# Patient Record
Sex: FEMALE | Race: WHITE | URBAN - METROPOLITAN AREA
[De-identification: names, ages, dates, MRNs, and addresses within clinical notes are randomized per-mention and may not be internally consistent; named-entity substitution may affect disease eponyms.]

---

## 2018-04-04 ENCOUNTER — AMBULATORY - HEALTHEAST (OUTPATIENT)
Dept: LAB | Facility: CLINIC | Age: 38
End: 2018-04-04

## 2018-04-04 DIAGNOSIS — O09.211 SUPERVISION OF PREGNANCY WITH HISTORY OF PRE-TERM LABOR IN FIRST TRIMESTER: ICD-10-CM

## 2018-04-04 DIAGNOSIS — Z34.90 PREGNANCY: ICD-10-CM

## 2018-04-04 LAB
HCG SERPL-ACNC: 888 MLU/ML (ref 0–4)
PROGEST SERPL-MCNC: 18.6 NG/ML

## 2018-05-03 ENCOUNTER — AMBULATORY - HEALTHEAST (OUTPATIENT)
Dept: LAB | Facility: CLINIC | Age: 38
End: 2018-05-03

## 2018-05-03 DIAGNOSIS — O09.891: ICD-10-CM

## 2018-05-03 LAB — PROGEST SERPL-MCNC: 22.2 NG/ML

## 2018-05-16 LAB
ABORH_EXT (HISTORICAL CONVERSION): NORMAL
ANTIBODY_EXT (HISTORICAL CONVERSION): NEGATIVE
HBSAG_EXT (HISTORICAL CONVERSION): NORMAL
HGB_EXT (HISTORICAL CONVERSION): 13.6
PLT_EXT - HISTORICAL: 279
RPR - HISTORICAL: NORMAL
RUBELLA_EXT (HISTORICAL CONVERSION): NORMAL

## 2018-05-31 ENCOUNTER — AMBULATORY - HEALTHEAST (OUTPATIENT)
Dept: LAB | Facility: CLINIC | Age: 38
End: 2018-05-31

## 2018-05-31 DIAGNOSIS — O09.211 SUPERVISION OF PREGNANCY WITH HISTORY OF PRE-TERM LABOR IN FIRST TRIMESTER: ICD-10-CM

## 2018-05-31 LAB — PROGEST SERPL-MCNC: 34.9 NG/ML

## 2018-11-09 ENCOUNTER — HOSPITAL ENCOUNTER (OUTPATIENT)
Dept: OBGYN | Facility: HOSPITAL | Age: 38
Discharge: HOME | End: 2018-11-10
Attending: OBSTETRICS & GYNECOLOGY | Admitting: OBSTETRICS & GYNECOLOGY

## 2018-11-09 LAB — GBS_EXT (HISTORICAL CONVERSION): POSITIVE

## 2018-11-24 ENCOUNTER — HOSPITAL ENCOUNTER (OUTPATIENT)
Dept: OBGYN | Facility: HOSPITAL | Age: 38
Discharge: HOME | End: 2018-11-24
Attending: OBSTETRICS & GYNECOLOGY | Admitting: OBSTETRICS & GYNECOLOGY

## 2018-11-24 LAB
ALBUMIN UR-MCNC: NEGATIVE MG/DL
APPEARANCE UR: CLEAR
BILIRUB UR QL STRIP: NEGATIVE
CLUE CELLS: ABNORMAL
COLOR UR AUTO: YELLOW
GLUCOSE UR STRIP-MCNC: NEGATIVE MG/DL
HGB UR QL STRIP: NEGATIVE
KETONES UR STRIP-MCNC: NEGATIVE MG/DL
LEUKOCYTE ESTERASE UR QL STRIP: NEGATIVE
NITRATE UR QL: NEGATIVE
PH UR STRIP: 6.5 [PH] (ref 4.5–8)
RUPTURE OF FETAL MEMBRANES BY ROM PLUS: NEGATIVE
SP GR UR STRIP: 1 (ref 1–1.03)
TRICHOMONAS, WET PREP: ABNORMAL
UROBILINOGEN UR STRIP-ACNC: NORMAL
YEAST, WET PREP: ABNORMAL

## 2018-11-24 ASSESSMENT — MIFFLIN-ST. JEOR: SCORE: 1598.48

## 2018-11-29 ENCOUNTER — RECORDS - HEALTHEAST (OUTPATIENT)
Dept: ADMINISTRATIVE | Facility: OTHER | Age: 38
End: 2018-11-29

## 2018-12-09 ENCOUNTER — HOME CARE/HOSPICE - HEALTHEAST (OUTPATIENT)
Dept: HOME HEALTH SERVICES | Facility: HOME HEALTH | Age: 38
End: 2018-12-09

## 2018-12-10 ENCOUNTER — HOME CARE/HOSPICE - HEALTHEAST (OUTPATIENT)
Dept: HOME HEALTH SERVICES | Facility: HOME HEALTH | Age: 38
End: 2018-12-10

## 2019-01-10 ENCOUNTER — COMMUNICATION - HEALTHEAST (OUTPATIENT)
Dept: MIDWIFE SERVICES | Facility: CLINIC | Age: 39
End: 2019-01-10

## 2019-01-17 ENCOUNTER — COMMUNICATION - HEALTHEAST (OUTPATIENT)
Dept: MIDWIFE SERVICES | Facility: CLINIC | Age: 39
End: 2019-01-17

## 2019-04-10 NOTE — TELEPHONE ENCOUNTER
FUTURE VISIT INFORMATION      FUTURE VISIT INFORMATION:    Date: 6/13/19    Time: 10:00am    Location: [facility] [place]  REFERRAL INFORMATION:    Referring provider:  Dr. Marcelino Flores    Referring providers clinic:  [facility]    Reason for visit/diagnosis:  Constipation    NOTES STATUS DETAILS   OFFICE NOTE from referring provider  In process    OFFICE NOTE from other specialist   N/A    DISCHARGE SUMMARY FROM HOSPITAL N/A    DISCHARGE REPORT FROM ED N/A    OPERATIVE REPORT  N/A    PFC REPORT N/A    MEDICATION LIST N/A    LABS     FIT/STOOL TESTING Care Everywhere    PERTINENT LABS N/A    PATHOLOGY REPORTS RELATED TO DIAGNOSIS N/A    DIAGNOSTIC PROCEDURES     COLONOSCOPY N/A    ENDOSCOPY (EGD) N/A    ERCP N/A    EUS N/A    FLEX SIGMOIDOSCOPY N/A    IMAGING & REPORT      CT, MRI, US, XR N/A      Mike ABUROT 6.7.19   Action Taken LVM at [facility] for medical records.     Mike ABURTO 6.10.19 2:32 PM   Action Taken LVM at [facility] for medical records. Phone number is [phone]     Mike ABURTO 6.11.19    Action Taken Received vm from [facility] stating pt was seen for OBGYN related issues, nothing related to GI.

## 2019-06-06 ENCOUNTER — TELEPHONE (OUTPATIENT)
Dept: GASTROENTEROLOGY | Facility: CLINIC | Age: 39
End: 2019-06-06

## 2019-06-12 ASSESSMENT — ENCOUNTER SYMPTOMS
HOARSE VOICE: 0
JOINT SWELLING: 0
JAUNDICE: 0
CHILLS: 0
NERVOUS/ANXIOUS: 1
CONSTIPATION: 1
RECTAL PAIN: 1
HEADACHES: 1
EYE PAIN: 0
INCREASED ENERGY: 1
SLEEP DISTURBANCES DUE TO BREATHING: 0
DECREASED APPETITE: 0
SORE THROAT: 0
SINUS PAIN: 0
FATIGUE: 1
POLYPHAGIA: 1
BLOATING: 1
LIGHT-HEADEDNESS: 1
SEIZURES: 0
SPEECH CHANGE: 0
ABDOMINAL PAIN: 1
PALPITATIONS: 0
POOR WOUND HEALING: 1
BACK PAIN: 1
VOMITING: 0
EYE WATERING: 1
MYALGIAS: 1
DIARRHEA: 1
NECK PAIN: 0
DIFFICULTY URINATING: 0
DECREASED LIBIDO: 1
NECK MASS: 0
SMELL DISTURBANCE: 0
INSOMNIA: 0
WEIGHT LOSS: 0
ORTHOPNEA: 0
TASTE DISTURBANCE: 1
ARTHRALGIAS: 1
BLOOD IN STOOL: 0
NAUSEA: 1
DISTURBANCES IN COORDINATION: 0
FEVER: 0
SYNCOPE: 0
LOSS OF CONSCIOUSNESS: 0
NIGHT SWEATS: 0
STIFFNESS: 0
BREAST MASS: 0
MUSCLE CRAMPS: 0
TREMORS: 0
FLANK PAIN: 0
EYE IRRITATION: 1
POLYDIPSIA: 1
ALTERED TEMPERATURE REGULATION: 1
DECREASED CONCENTRATION: 0
EYE REDNESS: 1
NAIL CHANGES: 0
HEARTBURN: 1
PARALYSIS: 0
HYPERTENSION: 0
TROUBLE SWALLOWING: 0
HEMATURIA: 0
NUMBNESS: 0
MEMORY LOSS: 0
BREAST PAIN: 1
MUSCLE WEAKNESS: 0
BOWEL INCONTINENCE: 1
SKIN CHANGES: 1
SINUS CONGESTION: 1
HOT FLASHES: 1
DYSURIA: 0
LEG PAIN: 0
HYPOTENSION: 0
PANIC: 0
EXERCISE INTOLERANCE: 1
WEIGHT GAIN: 1
DOUBLE VISION: 0
DIZZINESS: 1
TINGLING: 0
HALLUCINATIONS: 0
DEPRESSION: 0

## 2019-06-13 ENCOUNTER — OFFICE VISIT (OUTPATIENT)
Dept: GASTROENTEROLOGY | Facility: CLINIC | Age: 39
End: 2019-06-13
Payer: COMMERCIAL

## 2019-06-13 ENCOUNTER — PRE VISIT (OUTPATIENT)
Dept: GASTROENTEROLOGY | Facility: CLINIC | Age: 39
End: 2019-06-13

## 2019-06-13 VITALS
WEIGHT: 183.2 LBS | HEART RATE: 71 BPM | OXYGEN SATURATION: 98 % | BODY MASS INDEX: 30.52 KG/M2 | DIASTOLIC BLOOD PRESSURE: 69 MMHG | SYSTOLIC BLOOD PRESSURE: 115 MMHG | HEIGHT: 65 IN

## 2019-06-13 DIAGNOSIS — R19.4 CHANGE IN BOWEL HABITS: Primary | ICD-10-CM

## 2019-06-13 DIAGNOSIS — R19.4 CHANGE IN BOWEL HABITS: ICD-10-CM

## 2019-06-13 RX ORDER — MULTIVITAMIN WITH IRON
1 TABLET ORAL DAILY
COMMUNITY

## 2019-06-13 RX ORDER — PRENATAL VIT/IRON FUM/FOLIC AC 27MG-0.8MG
1 TABLET ORAL
COMMUNITY
Start: 2014-09-28 | End: 2020-05-04

## 2019-06-13 ASSESSMENT — MIFFLIN-ST. JEOR: SCORE: 1503.93

## 2019-06-13 ASSESSMENT — PAIN SCALES - GENERAL: PAINLEVEL: NO PAIN (0)

## 2019-06-13 NOTE — LETTER
6/13/2019       RE: Chiquis King  [address]  [address] [place] [address]     Dear Colleague,    Thank you for referring your patient, Chiquis King, to the [facility] GASTROENTEROLOGY AND IBD CLINIC at [facility]. Please see a copy of my visit note below.    GI CLINIC VISIT    CC/REFERRING MD:  Referred Self  REASON FOR CONSULTATION: Change in bowel pattern    ASSESSMENT/PLAN:  39-year-old female with past medical history to include herbal tunnel, tendinitis, 3 vaginal deliveries who presents to the GI clinic for consultation regarding change in bowel habits.    1.  Change in bowel habits: Patient has a concern for ongoing constipation.  Patient certainly would not meet the criteria for constipation given the consistency and frequency, usually a bowel movement per day.  She feels she is been quite restricted on her diet to allow for a more acceptable and reliable pattern.  She does take magnesium oxide 250 mg daily.  I have suggested for the patient to switch to magnesium oxide and start at 400 mg daily, max dose of the thousand milligrams daily.  This and comities with a soluble fiber supplementation, patient prefers psyllium husk may be tried to improve regularity.  To ensure that no inflammation is contributing to the patient's symptoms we will also obtain a fecal calprotectin.  Patient is unsure if she is ever been tested for celiac disease and we will proceed with TTG IgA and IgG as well as a total IgA.  Her stool pattern does not seem at all consistent with an infection and will not proceed with stool studies at this time but could be considered in the future.  --Change magnesium formulation to magnesium oxide, start at 400 mg daily and titrate to effect.  Of 2000 mg daily.  --Trial of soluble fiber, psyllium husk.  Start with a tablespoon per day and increase/decrease based on stool frequency and consistency.  --Start food and symptom journal  --Dietitian  "referral  --Stool sample for fecal calprotectin  --Blood work for TTG IgA and IgG and total IgA to screen for celiac disease    2. Colorectal cancer screening: Patient has no personal family history of colorectal cancer.  Recommend screening at the age of 45-50 unless symptomatic sooner.    RTC PRN    Thank you for this consultation.  It was a pleasure to participate in the care of this patient; please contact us with any further questions.     Jitendra Pacheco PA-C  Division of Gastroenterology, Hepatology and Nutrition  [facility]      HPI  39-year-old female with past medical history to include herbal tunnel, tendinitis, 3 vaginal deliveries who presents to the GI clinic for consultation regarding change in bowel habits.    Patient has had concerns for ongoing problems with constipation during and shortly following her first pregnancy.  Since this time she has primarily used magnesium citrate in gel capsule form at a dose of 250 mg to help with management.  She is also trying to variations in diet, probiotics, raw vegetables and daily caffeine intake to prompt a bowel movement.    Over the last 6 months she has found that her regimen of align probiotic, ensuring adequate intake of raw vegetables and magnesium citrate 250 mg daily allows for 1 bowel movement in the morning if she is at home.  No blood in the stool.  She does not include any of these regimens, she feels that she is \"off.\"  She may have a more loose stool, and may skip a day without a bowel movement.  Usually bowel movements are Crook scale 5-6.  She is not having liquid water stools.  She has had hemorrhoids present since her first pregnancy.  These have never been treated.    Patient is most bothered by the restrictive diet to maintain regularity.  She also notes soreness in her rectum and lower abdominal discomfort after a bowel movement that will eventually subside.  She has tried Metamucil and psyllium husk however not in long " duration and was unsure of the benefit.    ROS:    No fevers or chills  No weight loss  No blurry vision, double vision or change in vision  No sore throat  No lymphadenopathy  No headache, paraesthesias, or weakness in a limb  No shortness of breath or wheezing  No chest pain or pressure  No arthralgias or myalgias  No rashes or skin changes  No odynophagia or dysphagia  No BRBPR, hematochezia, melena  No dysuria, frequency or urgency  No hot/cold intolerance or polyria  No anxiety or depression    PROBLEM LIST  There are no active problems to display for this patient.    PERTINENT PAST MEDICAL HISTORY:  Past Medical History:   Diagnosis Date     Anxiety 2006?     Arthritis 2016    low back/hip area     Bone and joint disord back, pelvis, leg complicat preg, childb, puerp 200?: thoracic scoliosis    2018: Diastasis Recti; 2013 tendonitis; 2018 carpeltunnel     Chronic constipation 2014?     Chronic diarrhea 2014?    consistently occasional     Depressive disorder 2012?     Endocrine problem since adolescence    clammy hands     Fecal incontinence 2018?    from hemorrhoids     Fracture 1991?    arm     Head injury 1994/5 8th grade    softball hit hard on forehead     History of diabetes mellitus 2012&2014    pregnancy     Migraines 2010?     Other heart disorders in diseases classified elsewhere 1986?    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    Since first pregnancy?     Urinary tract infection 2012 and 1 other since?       PREVIOUS SURGERIES:  No past surgical history on file.    ALLERGIES:   No Known Allergies    PERTINENT MEDICATIONS:    Current Outpatient Medications:      CHOLECALCIFEROL PO, Take by mouth daily, Disp: , Rfl:      MAGNESIUM CITRATE PO, 250 mg daily, Disp: , Rfl:      Prenatal Vit-Fe Fumarate-FA (PRENATAL MULTIVITAMIN W/IRON) 27-0.8 MG tablet, Take 1 tablet by mouth, Disp: , Rfl:      sertraline (ZOLOFT) 50 MG tablet, Take 50 mg by  mouth, Disp: , Rfl:      UNABLE TO FIND, MEDICATION NAME: [place] Probiotic, Once a day, Disp: , Rfl:      UNABLE TO FIND, MEDICATION NAME: Align Probiotic, once daily, Disp: , Rfl:      vitamin (B COMPLEX-C) tablet, Take 1 tablet by mouth daily, Disp: , Rfl:     SOCIAL HISTORY:  Social History     Socioeconomic History     Marital status:      Spouse name: Not on file     Number of children: Not on file     Years of education: Not on file     Highest education level: Not on file   Occupational History     Not on file   Social Needs     Financial resource strain: Not on file     Food insecurity:     Worry: Not on file     Inability: Not on file     Transportation needs:     Medical: Not on file     Non-medical: Not on file   Tobacco Use     Smoking status: Never Smoker     Smokeless tobacco: Never Used   Substance and Sexual Activity     Alcohol use: Yes     Comment: 1-5 drinks/week     Drug use: Never     Sexual activity: Yes     Partners: Male     Birth control/protection: Abstinence, Condom, Natural Family Planning   Lifestyle     Physical activity:     Days per week: Not on file     Minutes per session: Not on file     Stress: Not on file   Relationships     Social connections:     Talks on phone: Not on file     Gets together: Not on file     Attends Religion service: Not on file     Active member of club or organization: Not on file     Attends meetings of clubs or organizations: Not on file     Relationship status: Not on file     Intimate partner violence:     Fear of current or ex partner: Not on file     Emotionally abused: Not on file     Physically abused: Not on file     Forced sexual activity: Not on file   Other Topics Concern     Not on file   Social History Narrative     Not on file       FAMILY HISTORY:  FH of CRC: None  FH of IBD: None  Family History   Problem Relation Age of Onset     Diabetes Maternal Grandmother          from kidney failure as a result     Breast Cancer  "Cousin         recent diagnosis     Colon Cancer Cousin          in the  or      Other Cancer Paternal Grandfather         Non Hodgkins Lymphoma/Leukemia     Other Cancer Father         Non Hodgkins Lymphoma/Leukemia     Depression Niece      Anxiety Disorder Niece      Unknown/Adopted Maternal Half-Brother         My mom had a baby boy prior to  who was adopted at birth       Past/family/social history reviewed and no changes    PHYSICAL EXAMINATION:  Constitutional: aaox3, cooperative, pleasant, not dyspneic/diaphoretic, no acute distress  Vitals reviewed: /69   Pulse 71   Ht 1.638 m (5' 4.5\")   Wt 83.1 kg (183 lb 3.2 oz)   SpO2 98%   BMI 30.96 kg/m     Wt:   Wt Readings from Last 2 Encounters:   19 83.1 kg (183 lb 3.2 oz)      Eyes: Sclera anicteric/injected  Ears/nose/mouth/throat: Normal oropharynx without ulcers or exudate, mucus membranes moist, hearing intact  Neck: supple, thyroid normal size  CV: No edema  Respiratory: Unlabored breathing  Lymph: No axillary, submandibular, supraclavicular or inguinal lymphadenopathy  Abd: Nondistended, +bs, no hepatosplenomegaly, nontender, no peritoneal signs  Skin: warm, perfused, no jaundice  Psych: Normal affect  MSK: Normal gait    PERTINENT STUDIES:    No results found for any previous visit.       Jitendra Pacheco PA-C      "

## 2019-06-13 NOTE — PROGRESS NOTES
GI CLINIC VISIT    CC/REFERRING MD:  Referred Self  REASON FOR CONSULTATION: Change in bowel pattern    ASSESSMENT/PLAN:  39-year-old female with past medical history to include herbal tunnel, tendinitis, 3 vaginal deliveries who presents to the GI clinic for consultation regarding change in bowel habits.    1.  Change in bowel habits: Patient has a concern for ongoing constipation.  Patient certainly would not meet the criteria for constipation given the consistency and frequency, usually a bowel movement per day.  She feels she is been quite restricted on her diet to allow for a more acceptable and reliable pattern.  She does take magnesium oxide 250 mg daily.  I have suggested for the patient to switch to magnesium oxide and start at 400 mg daily, max dose of the thousand milligrams daily.  This and comities with a soluble fiber supplementation, patient prefers psyllium husk may be tried to improve regularity.  To ensure that no inflammation is contributing to the patient's symptoms we will also obtain a fecal calprotectin.  Patient is unsure if she is ever been tested for celiac disease and we will proceed with TTG IgA and IgG as well as a total IgA.  Her stool pattern does not seem at all consistent with an infection and will not proceed with stool studies at this time but could be considered in the future.  --Change magnesium formulation to magnesium oxide, start at 400 mg daily and titrate to effect.  Of 2000 mg daily.  --Trial of soluble fiber, psyllium husk.  Start with a tablespoon per day and increase/decrease based on stool frequency and consistency.  --Start food and symptom journal  --Dietitian referral  --Stool sample for fecal calprotectin  --Blood work for TTG IgA and IgG and total IgA to screen for celiac disease    2. Colorectal cancer screening: Patient has no personal family history of colorectal cancer.  Recommend screening at the age of 45-50 unless symptomatic sooner.    RTC PRN    Thank you  "for this consultation.  It was a pleasure to participate in the care of this patient; please contact us with any further questions.       Jitendra Pacheco PA-C  Division of Gastroenterology, Hepatology and Nutrition  [facility]      HPI  39-year-old female with past medical history to include herbal tunnel, tendinitis, 3 vaginal deliveries who presents to the GI clinic for consultation regarding change in bowel habits.    Patient has had concerns for ongoing problems with constipation during and shortly following her first pregnancy.  Since this time she has primarily used magnesium citrate in gel capsule form at a dose of 250 mg to help with management.  She is also trying to variations in diet, probiotics, raw vegetables and daily caffeine intake to prompt a bowel movement.    Over the last 6 months she has found that her regimen of align probiotic, ensuring adequate intake of raw vegetables and magnesium citrate 250 mg daily allows for 1 bowel movement in the morning if she is at home.  No blood in the stool.  She does not include any of these regimens, she feels that she is \"off.\"  She may have a more loose stool, and may skip a day without a bowel movement.  Usually bowel movements are Anthon scale 5-6.  She is not having liquid water stools.  She has had hemorrhoids present since her first pregnancy.  These have never been treated.    Patient is most bothered by the restrictive diet to maintain regularity.  She also notes soreness in her rectum and lower abdominal discomfort after a bowel movement that will eventually subside.  She has tried Metamucil and psyllium husk however not in long duration and was unsure of the benefit.    ROS:    No fevers or chills  No weight loss  No blurry vision, double vision or change in vision  No sore throat  No lymphadenopathy  No headache, paraesthesias, or weakness in a limb  No shortness of breath or wheezing  No chest pain or pressure  No arthralgias or " myalgias  No rashes or skin changes  No odynophagia or dysphagia  No BRBPR, hematochezia, melena  No dysuria, frequency or urgency  No hot/cold intolerance or polyria  No anxiety or depression    PROBLEM LIST  There are no active problems to display for this patient.      PERTINENT PAST MEDICAL HISTORY:  Past Medical History:   Diagnosis Date     Anxiety 2006?     Arthritis 2016    low back/hip area     Bone and joint disord back, pelvis, leg complicat preg, childb, puerp 200?: thoracic scoliosis    2018: Diastasis Recti; 2013 tendonitis; 2018 carpeltunnel     Chronic constipation 2014?     Chronic diarrhea 2014?    consistently occasional     Depressive disorder 2012?     Endocrine problem since adolescence    clammy hands     Fecal incontinence 2018?    from hemorrhoids     Fracture 1991?    arm     Head injury 1994/5 8th grade    softball hit hard on forehead     History of diabetes mellitus 2012&2014    pregnancy     Migraines 2010?     Other heart disorders in diseases classified elsewhere 1986?    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    Since first pregnancy?     Urinary tract infection 2012 and 1 other since?       PREVIOUS SURGERIES:  No past surgical history on file.    ALLERGIES:   No Known Allergies    PERTINENT MEDICATIONS:    Current Outpatient Medications:      CHOLECALCIFEROL PO, Take by mouth daily, Disp: , Rfl:      MAGNESIUM CITRATE PO, 250 mg daily, Disp: , Rfl:      Prenatal Vit-Fe Fumarate-FA (PRENATAL MULTIVITAMIN W/IRON) 27-0.8 MG tablet, Take 1 tablet by mouth, Disp: , Rfl:      sertraline (ZOLOFT) 50 MG tablet, Take 50 mg by mouth, Disp: , Rfl:      UNABLE TO FIND, MEDICATION NAME: [place] Probiotic, Once a day, Disp: , Rfl:      UNABLE TO FIND, MEDICATION NAME: Align Probiotic, once daily, Disp: , Rfl:      vitamin (B COMPLEX-C) tablet, Take 1 tablet by mouth daily, Disp: , Rfl:     SOCIAL HISTORY:  Social History      Socioeconomic History     Marital status:      Spouse name: Not on file     Number of children: Not on file     Years of education: Not on file     Highest education level: Not on file   Occupational History     Not on file   Social Needs     Financial resource strain: Not on file     Food insecurity:     Worry: Not on file     Inability: Not on file     Transportation needs:     Medical: Not on file     Non-medical: Not on file   Tobacco Use     Smoking status: Never Smoker     Smokeless tobacco: Never Used   Substance and Sexual Activity     Alcohol use: Yes     Comment: 1-5 drinks/week     Drug use: Never     Sexual activity: Yes     Partners: Male     Birth control/protection: Abstinence, Condom, Natural Family Planning   Lifestyle     Physical activity:     Days per week: Not on file     Minutes per session: Not on file     Stress: Not on file   Relationships     Social connections:     Talks on phone: Not on file     Gets together: Not on file     Attends Sabianist service: Not on file     Active member of club or organization: Not on file     Attends meetings of clubs or organizations: Not on file     Relationship status: Not on file     Intimate partner violence:     Fear of current or ex partner: Not on file     Emotionally abused: Not on file     Physically abused: Not on file     Forced sexual activity: Not on file   Other Topics Concern     Not on file   Social History Narrative     Not on file       FAMILY HISTORY:  FH of CRC: None  FH of IBD: None  Family History   Problem Relation Age of Onset     Diabetes Maternal Grandmother          from kidney failure as a result     Breast Cancer Cousin         recent diagnosis     Colon Cancer Cousin          in the '80s or s     Other Cancer Paternal Grandfather         Non Hodgkins Lymphoma/Leukemia     Other Cancer Father         Non Hodgkins Lymphoma/Leukemia     Depression Niece      Anxiety Disorder Niece      Unknown/Adopted Maternal  "Half-Brother         My mom had a baby boy prior to 1973 who was adopted at birth       Past/family/social history reviewed and no changes    PHYSICAL EXAMINATION:  Constitutional: aaox3, cooperative, pleasant, not dyspneic/diaphoretic, no acute distress  Vitals reviewed: /69   Pulse 71   Ht 1.638 m (5' 4.5\")   Wt 83.1 kg (183 lb 3.2 oz)   SpO2 98%   BMI 30.96 kg/m    Wt:   Wt Readings from Last 2 Encounters:   06/13/19 83.1 kg (183 lb 3.2 oz)      Eyes: Sclera anicteric/injected  Ears/nose/mouth/throat: Normal oropharynx without ulcers or exudate, mucus membranes moist, hearing intact  Neck: supple, thyroid normal size  CV: No edema  Respiratory: Unlabored breathing  Lymph: No axillary, submandibular, supraclavicular or inguinal lymphadenopathy  Abd: Nondistended, +bs, no hepatosplenomegaly, nontender, no peritoneal signs  Skin: warm, perfused, no jaundice  Psych: Normal affect  MSK: Normal gait      PERTINENT STUDIES:    No results found for any previous visit.         Answers for HPI/ROS submitted by the patient on 6/12/2019   General Symptoms: Yes  Skin Symptoms: Yes  HENT Symptoms: Yes  EYE SYMPTOMS: Yes  HEART SYMPTOMS: Yes  LUNG SYMPTOMS: No  INTESTINAL SYMPTOMS: Yes  URINARY SYMPTOMS: Yes  GYNECOLOGIC SYMPTOMS: Yes  BREAST SYMPTOMS: Yes  SKELETAL SYMPTOMS: Yes  BLOOD SYMPTOMS: No  NERVOUS SYSTEM SYMPTOMS: Yes  MENTAL HEALTH SYMPTOMS: Yes  Fever: No  Loss of appetite: No  Weight loss: No  Weight gain: Yes  Fatigue: Yes  Night sweats: No  Chills: No  Increased stress: Yes  Excessive hunger: Yes  Excessive thirst: Yes  Feeling hot or cold when others believe the temperature is normal: Yes  Loss of height: No  Surgical site pain: No  Hallucinations: No  Change in or Loss of Energy: Yes  Hyperactivity: No  Confusion: No  Changes in hair: No  Changes in moles/birth marks: Yes  Itching: Yes  Rashes: No  Changes in nails: No  Acne: No  Hair in places you don't want it: No  Change in facial hair: " No  Warts: No  Non-healing sores: Yes  Scarring: No  Flaking of skin: Yes  Color changes of hands/feet in cold : No  Sun sensitivity: Yes  Skin thickening: No  Ear pain: No  Hearing loss: No  Tinnitus: Yes  Nosebleeds: No  Congestion: Yes  Sinus pain: No  Trouble swallowing: No   Voice hoarseness: No  Mouth sores: Yes  Sore throat: No  Tooth pain: No  Gum tenderness: Yes  Bleeding gums: Yes  Change in taste: Yes  Change in sense of smell: No  Dry mouth: Yes  Hearing aid used: No  Neck lump: No  Eye pain: No  Vision loss: No  Dry eyes: Yes  Watery eyes: Yes  Eye bulging: No  Double vision: No  Flashing of lights: No  Spots: No  Floaters: No  Redness: Yes  Crossed eyes: No  Tunnel Vision: No  Yellowing of eyes: No  Eye irritation: Yes  Chest pain or pressure: No  Fast or irregular heartbeat: No  Pain in legs with walking: No  Trouble breathing while lying down: No  Fingers or toes appear blue: No  High blood pressure: No  Low blood pressure: No  Fainting: No  Murmurs: No  Pacemaker: No  Varicose veins: No  Edema or swelling: Yes  Wake up at night with shortness of breath: No  Light-headedness: Yes  Exercise intolerance: Yes  Heart burn or indigestion: Yes  Nausea: Yes  Vomiting: No  Abdominal pain: Yes  Bloating: Yes  Constipation: Yes  Diarrhea: Yes  Blood in stool: No  Black stools: No  Rectal or Anal pain: Yes  Fecal incontinence: Yes  Yellowing of skin or eyes: No  Vomit with blood: No  Change in stools: Yes  Trouble holding urine or incontinence: Yes  Pain or burning: No  Trouble starting or stopping: Yes  Increased frequency of urination: No  Blood in urine: No  Decreased frequency of urination: No  Frequent nighttime urination: No  Flank pain: No  Difficulty emptying bladder: No  Back pain: Yes  Muscle aches: Yes  Neck pain: No  Swollen joints: No  Joint pain: Yes  Bone pain: No  Muscle cramps: No  Muscle weakness: No  Joint stiffness: No  Bone fracture: No  Trouble with coordination: No  Dizziness or trouble  with balance: Yes  Fainting or black-out spells: No  Memory loss: No  Headache: Yes  Seizures: No  Speech problems: No  Tingling: No  Tremor: No  Difficulty walking: No  Paralysis: No  Numbness: No  Bleeding or spotting between periods: No  Heavy or painful periods: No  Irregular periods: No  Vaginal discharge: No  Hot flashes: Yes  Vaginal dryness: No  Genital ulcers: No  Reduced libido: Yes  Painful intercourse: No  Difficulty with sexual arousal: No  Post-menopausal bleeding: No  Discharge: No  Lumps: No  Pain: Yes  Nipple retraction: No  Nervous or Anxious: Yes  Depression: No  Trouble sleeping: No  Trouble thinking or concentrating: No  Mood changes: Yes  Panic attacks: No

## 2019-06-13 NOTE — NURSING NOTE
"Chief Complaint   Patient presents with     New Patient     New consult       Vitals:    06/13/19 0958   BP: 115/69   Pulse: 71   SpO2: 98%   Weight: 83.1 kg (183 lb 3.2 oz)   Height: 1.638 m (5' 4.5\")       Body mass index is 30.96 kg/m .    Janice Wallace CMA    "

## 2019-06-13 NOTE — PATIENT INSTRUCTIONS
It was a pleasure taking care of you today.  I've included a brief summary of our discussion and care plan from today's visit below.  Please review this information with your primary care provider.  ______________________________________________________________________    My recommendations are summarized as follows:    -- Labs today  -- Stool sample when able    -- Recommend soluble fiber supplementation on a daily basis (Metamucil, citrucel or benefiber).  Start with 1 tablespoon per day and if tolerated, may increase up to 2-3 tablespoons per day.  You may experience some bloating with initiation of fiber supplementation that will improve over the first month.  A good fiber trial to evaluate the effect is 3-6 months.    -- Consider switching to magnesium oxide 400 mg daily (max dose is 1000 mg daily). Alternatively, recommend a trial of Miralax.  This is not a stimulant laxative and is safe to take on a daily basis.  Try 1 cap(s) full every day.  You can titrate this dose (increase or decrease) based on stool frequency and consistency.   ______________________________________________________________________    Who do I call with any questions after my visit?  Please be in touch if there are any further questions that arise following today's visit.  There are multiple ways to contact your gastroenterology care team.        During business hours, you may reach a Gastroenterology nurse at [phone], option 3.       To schedule or reschedule an appointment, please call [phone].       You can always send a secure message through [email or web address].  [email or web address] messages are answered by your nurse or doctor typically within 24 hours.  Please allow extra time on weekends and holidays.        For urgent/emergent questions after business hours, you may reach the on-call GI Fellow by contacting the [facility] at [phone].      In order for your refill to be processed in a timely fashion, it is your  responsibility to ensure you follow the recommendations from your provider regarding your laboratory studies and follow up appointments.       How will I get the results of any tests ordered?    You will receive all of your results.  If you have signed up for [employer], any tests ordered at your visit will be available to you after your physician reviews them.  Typically this takes 1-2 weeks.  If there are urgent results that require a change in your care plan, your physician or nurse will call you to discuss the next steps.      What is [employer]?  [employer] is a secure way for you to access all of your healthcare records from the [facility].  It is a web based computer program, so you can sign on to it from any location.  It also allows you to send secure messages to your care team.  I recommend signing up for [employer] access if you have not already done so and are comfortable with using a computer.      How to I schedule a follow-up visit?  If you did not schedule a follow-up visit today, please call [phone] to schedule a follow-up office visit.        Sincerely,    Jitendra Pacheco PA-C  [facility]  Division of Gastroenterology

## 2019-06-14 LAB — IGA SERPL-MCNC: 77 MG/DL (ref 70–380)

## 2019-06-17 LAB
TTG IGA SER-ACNC: <1 U/ML
TTG IGG SER-ACNC: <1 U/ML

## 2020-03-11 ENCOUNTER — HEALTH MAINTENANCE LETTER (OUTPATIENT)
Age: 40
End: 2020-03-11

## 2020-05-04 ENCOUNTER — VIRTUAL VISIT (OUTPATIENT)
Dept: GASTROENTEROLOGY | Facility: CLINIC | Age: 40
End: 2020-05-04
Payer: COMMERCIAL

## 2020-05-04 DIAGNOSIS — K58.1 IRRITABLE BOWEL SYNDROME WITH CONSTIPATION: Primary | ICD-10-CM

## 2020-05-04 ASSESSMENT — PAIN SCALES - GENERAL: PAINLEVEL: MILD PAIN (3)

## 2020-05-04 NOTE — NURSING NOTE
Chief Complaint   Patient presents with     RECHECK     follow up       There were no vitals filed for this visit.    There is no height or weight on file to calculate BMI.    Janice Nobles, CMA

## 2020-05-04 NOTE — PATIENT INSTRUCTIONS
It was a pleasure taking care of you today.  I've included a brief summary of our discussion and care plan from today's visit below.  Please review this information with your primary care provider.  ______________________________________________________________________    My recommendations are summarized as follows:    --Continue magnesium oxide 800 mg daily  -- Dietian consult  -- GI health psychology consult ______________________________________________________________________    Who do I call with any questions after my visit?  Please be in touch if there are any further questions that arise following today's visit.  There are multiple ways to contact your gastroenterology care team.        During business hours, you may reach a Gastroenterology nurse at [phone], option 3.       To schedule or reschedule an appointment, please call [phone].       You can always send a secure message through [employer].  [employer] messages are answered by your nurse or doctor typically within 24 hours.  Please allow extra time on weekends and holidays.        For urgent/emergent questions after business hours, you may reach the on-call GI Fellow by contacting the [facility]  at [phone].      In order for your refill to be processed in a timely fashion, it is your responsibility to ensure you follow the recommendations from your provider regarding your laboratory studies and follow up appointments.       How will I get the results of any tests ordered?    You will receive all of your results.  If you have signed up for [employer], any tests ordered at your visit will be available to you after your physician reviews them.  Typically this takes 1-2 weeks.  If there are urgent results that require a change in your care plan, your physician or nurse will call you to discuss the next steps.      What is [employer]?  [employer] is a secure way for you to access all of your healthcare records from the [facility]  [place].  It is a web based computer program, so you can sign on to it from any location.  It also allows you to send secure messages to your care team.  I recommend signing up for [employer] access if you have not already done so and are comfortable with using a computer.      How to I schedule a follow-up visit?  If you did not schedule a follow-up visit today, please call [phone] to schedule a follow-up office visit.        Sincerely,    Jitendra Pacheco PA-C  [facility]  Division of Gastroenterology

## 2020-05-04 NOTE — PROGRESS NOTES
"Chiquis King is a 40 year old female who is being evaluated via a billable video visit.      The patient has been notified of following:     \"This video visit will be conducted via a call between you and your physician/provider. We have found that certain health care needs can be provided without the need for an in-person physical exam.  This service lets us provide the care you need with a video conversation.  If a prescription is necessary we can send it directly to your pharmacy.  If lab work is needed we can place an order for that and you can then stop by our lab to have the test done at a later time.    Video visits are billed at different rates depending on your insurance coverage.  Please reach out to your insurance provider with any questions.    If during the course of the call the physician/provider feels a video visit is not appropriate, you will not be charged for this service.\"    Patient has given verbal consent for Video visit? Yes    How would you like to obtain your AVS? [employer]    Patient would like the video invitation sent by: Text to cell phone: [phone]    Will anyone else be joining your video visit? No        Video-Visit Details    Type of service:  Video Visit    Video Start Time: 3:12 PM  Video End Time: 3:36 PM    Originating Location (pt. Location): Home    Distant Location (provider location):  [facility]     Platform used for Video Visit: Clare Pacheco PA-C      GI CLINIC VISIT    CC/REFERRING MD:  Referred Self  REASON FOR CONSULTATION: Change in bowel pattern    ASSESSMENT/PLAN:  40-year-old female with past medical history to include herbal tunnel, tendinitis, 3 vaginal deliveries who presents to the GI clinic for follow-up regarding change in bowel habits.    1.  Change in bowel habits: There was some concern regarding ongoing constipation, however patient has been having anywhere from 3 stools a week to 3 stools a day and has " improved frequency with the use of magnesium oxide 800 mg daily.  She describes no effect with soluble fiber, however but then mentions it did allow for things to move through on a regular basis which is what soluble fiber is intended to do.  I would encourage the use of soluble fiber on a regular basis whether that is an supplementation or within her diet.  She seems to have found good success with low FODMAP and would like further assistance from our dietitian colleagues.  We will provide a referral.  Last year at original consultation, celiac markers were normal.  Stool has been formed and inconsistent with an infection.  We did consider proceeding with a fecal calprotectin, however we will hold off at this time unless symptoms change.  --Dietitian consultation  --Continue magnesium oxide 800 mg daily, maximum is 1000 mg daily  --Would recommend continuing soluble fiber in the form of supplementation and or dietary    2. Colorectal cancer screening: Patient has no personal family history of colorectal cancer.  Recommend screening at the age of 45-50 unless symptomatic sooner.    RTC PRN    Thank you for this consultation.  It was a pleasure to participate in the care of this patient; please contact us with any further questions.       Jitendra Pacheco PA-C  Division of Gastroenterology, Hepatology and Nutrition  [facility]      HPI  40-year-old female with past medical history to include herbal tunnel, tendinitis, 3 vaginal deliveries who presents to the GI clinic for consultation regarding change in bowel habits.    Patient has had concerns for ongoing problems with constipation during and shortly following her first pregnancy.  Since this time she has primarily used magnesium citrate in gel capsule form at a dose of 250 mg to help with management.  She is also trying to variations in diet, probiotics, raw vegetables and daily caffeine intake to prompt a bowel movement.    Over the last 6 months she has  "found that her regimen of align probiotic, ensuring adequate intake of raw vegetables and magnesium citrate 250 mg daily allows for 1 bowel movement in the morning if she is at home.  No blood in the stool.  She does not include any of these regimens, she feels that she is \"off.\"  She may have a more loose stool, and may skip a day without a bowel movement.  Usually bowel movements are Marquette scale 5-6.  She is not having liquid water stools.  She has had hemorrhoids present since her first pregnancy.  These have never been treated.    Patient is most bothered by the restrictive diet to maintain regularity.  She also notes soreness in her rectum and lower abdominal discomfort after a bowel movement that will eventually subside.  She has tried Metamucil and psyllium husk however not in long duration and was unsure of the benefit.    Interval history 5/4/20  Patient presents for follow-up regarding concern for irregular bowel pattern, occasional abdominal discomfort associated following her bowel movements and and interested in pursuing low FODMAP diet.  Her current bowel pattern is anywhere from 0-3 stools per day.  She may skip 1 to 2 days out of the week without a bowel movement.  She is currently taking magnesium oxide 800 mg daily.  She did take psyllium husk on a regular basis for 6 months, however she did not feel that this was helpful in improving the consistency or regularity of her stools.  Occasionally she may have some discomfort after a bowel movement describing a need to sit down following, but unable to further describe the discomfort.  This in general has improved.  She has already tried the low FODMAP diet and has found this helpful, but would like additional dietitian support.    ROS:    No fevers or chills  No weight loss  No blurry vision, double vision or change in vision  No sore throat  No lymphadenopathy  No headache, paraesthesias, or weakness in a limb  No shortness of breath or wheezing  No " chest pain or pressure  No arthralgias or myalgias  No rashes or skin changes  No odynophagia or dysphagia  No BRBPR, hematochezia, melena  No dysuria, frequency or urgency  No hot/cold intolerance or polyria  No anxiety or depression    PROBLEM LIST  There are no active problems to display for this patient.      PERTINENT PAST MEDICAL HISTORY:  Past Medical History:   Diagnosis Date     Anxiety 2006?     Arthritis 2016    low back/hip area     Bone and joint disord back, pelvis, leg complicat preg, childb, puerp 200?: thoracic scoliosis    2018: Diastasis Recti; 2013 tendonitis; 2018 carpeltunnel     Chronic constipation 2014?     Chronic diarrhea 2014?    consistently occasional     Depressive disorder 2012?     Endocrine problem since adolescence    clammy hands     Fecal incontinence 2018?    from hemorrhoids     Fracture 1991?    arm     Head injury 1994/5 8th grade    softball hit hard on forehead     History of diabetes mellitus 2012&2014    pregnancy     Migraines 2010?     Other heart disorders in diseases classified elsewhere 1986?    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    Since first pregnancy?     Urinary tract infection 2012 and 1 other since?       PREVIOUS SURGERIES:  No past surgical history on file.    ALLERGIES:   No Known Allergies    PERTINENT MEDICATIONS:    Current Outpatient Medications:      CHASTE TREE PO, 440 mg daily, Disp: , Rfl:      Cholecalciferol (VITAMIN D-3) 125 MCG (5000 UT) TABS, , Disp: , Rfl:      UNABLE TO FIND, MEDICATION NAME: Spectrum fish oil 2000mg/day, Disp: , Rfl:      UNABLE TO FIND, MEDICATION NAME: Align Probiotic, once daily, Disp: , Rfl:      vitamin (B COMPLEX-C) tablet, Take 1 tablet by mouth daily, Disp: , Rfl:     SOCIAL HISTORY:  Social History     Socioeconomic History     Marital status:      Spouse name: Not on file     Number of children: Not on file     Years of education: Not on file      Highest education level: Not on file   Occupational History     Not on file   Social Needs     Financial resource strain: Not on file     Food insecurity     Worry: Not on file     Inability: Not on file     Transportation needs     Medical: Not on file     Non-medical: Not on file   Tobacco Use     Smoking status: Never Smoker     Smokeless tobacco: Never Used   Substance and Sexual Activity     Alcohol use: Yes     Comment: 1-5 drinks/week     Drug use: Never     Sexual activity: Yes     Partners: Male     Birth control/protection: Abstinence, Condom, Natural Family Planning   Lifestyle     Physical activity     Days per week: Not on file     Minutes per session: Not on file     Stress: Not on file   Relationships     Social connections     Talks on phone: Not on file     Gets together: Not on file     Attends [employer] service: Not on file     Active member of club or organization: Not on file     Attends meetings of clubs or organizations: Not on file     Relationship status: Not on file     Intimate partner violence     Fear of current or ex partner: Not on file     Emotionally abused: Not on file     Physically abused: Not on file     Forced sexual activity: Not on file   Other Topics Concern     Not on file   Social History Narrative     Not on file       FAMILY HISTORY:  FH of CRC: None  FH of IBD: None  Family History   Problem Relation Age of Onset     Diabetes Maternal Grandmother          from kidney failure as a result     Breast Cancer Cousin         recent diagnosis     Colon Cancer Cousin          in the '80s or      Other Cancer Paternal Grandfather         Non Hodgkins Lymphoma/Leukemia     Other Cancer Father         Non Hodgkins Lymphoma/Leukemia     Depression Niece      Anxiety Disorder Niece      Unknown/Adopted Maternal Half-Brother         My mom had a baby boy prior to  who was adopted at birth       Past/family/social history reviewed and no changes    PHYSICAL  EXAMINATION:  Constitutional: aaox3, cooperative, pleasant, not dyspneic/diaphoretic, no acute distress  Vitals reviewed: There were no vitals taken for this visit.  Wt:   Wt Readings from Last 2 Encounters:   06/13/19 83.1 kg (183 lb 3.2 oz)      Eyes: Sclera anicteric/injected  Ears/nose/mouth/throat: Normal oropharynx without ulcers or exudate, mucus membranes moist, hearing intact  Neck: supple, thyroid normal size  CV: No edema  Respiratory: Unlabored breathing  Lymph: No axillary, submandibular, supraclavicular or inguinal lymphadenopathy  Abd: Nondistended, +bs, no hepatosplenomegaly, nontender, no peritoneal signs  Skin: warm, perfused, no jaundice  Psych: Normal affect  MSK: Normal gait      PERTINENT STUDIES:    No results found for any previous visit.

## 2020-05-05 ENCOUNTER — TELEPHONE (OUTPATIENT)
Dept: GASTROENTEROLOGY | Facility: CLINIC | Age: 40
End: 2020-05-05

## 2020-05-13 ENCOUNTER — TELEPHONE (OUTPATIENT)
Dept: GASTROENTEROLOGY | Facility: CLINIC | Age: 40
End: 2020-05-13

## 2020-05-13 DIAGNOSIS — K58.9 IBS (IRRITABLE BOWEL SYNDROME): Primary | ICD-10-CM

## 2020-05-13 NOTE — TELEPHONE ENCOUNTER
[facility] Call Center    Phone Message    May a detailed message be left on voicemail: yes     Reason for Call: Other: Patient calling to make 1st appt with Grace Bolaños.  Please follow up with patient.  Thank you!     Action Taken: Message routed to:  [facility]    Travel Screening: Not Applicable

## 2020-05-15 ENCOUNTER — VIRTUAL VISIT (OUTPATIENT)
Dept: GASTROENTEROLOGY | Facility: CLINIC | Age: 40
End: 2020-05-15
Payer: COMMERCIAL

## 2020-05-15 DIAGNOSIS — F43.23 ADJUSTMENT DISORDER WITH MIXED ANXIETY AND DEPRESSED MOOD: Primary | ICD-10-CM

## 2020-05-15 ASSESSMENT — ANXIETY QUESTIONNAIRES
1. FEELING NERVOUS, ANXIOUS, OR ON EDGE: NEARLY EVERY DAY
7. FEELING AFRAID AS IF SOMETHING AWFUL MIGHT HAPPEN: MORE THAN HALF THE DAYS
GAD7 TOTAL SCORE: 14
4. TROUBLE RELAXING: MORE THAN HALF THE DAYS
2. NOT BEING ABLE TO STOP OR CONTROL WORRYING: MORE THAN HALF THE DAYS
3. WORRYING TOO MUCH ABOUT DIFFERENT THINGS: SEVERAL DAYS
5. BEING SO RESTLESS THAT IT IS HARD TO SIT STILL: SEVERAL DAYS
6. BECOMING EASILY ANNOYED OR IRRITABLE: NEARLY EVERY DAY

## 2020-05-15 ASSESSMENT — PATIENT HEALTH QUESTIONNAIRE - PHQ9: SUM OF ALL RESPONSES TO PHQ QUESTIONS 1-9: 13

## 2020-05-15 NOTE — PROGRESS NOTES
"Chiquis King is a 40 year old female who is being evaluated via a billable video visit.      The patient has been notified of following:     \"This video visit will be conducted via a call between you and your physician/provider. We have found that certain health care needs can be provided without the need for an in-person physical exam.  This service lets us provide the care you need with a video conversation.  If a prescription is necessary we can send it directly to your pharmacy.  If lab work is needed we can place an order for that and you can then stop by our lab to have the test done at a later time.    Video visits are billed at different rates depending on your insurance coverage.  Please reach out to your insurance provider with any questions.    If during the course of the call the physician/provider feels a video visit is not appropriate, you will not be charged for this service.\"    Patient has given verbal consent for Video visit? Yes    How [facility] you [facility]    Patient would like the video invitation sent by: Text to cell phone: [phone]    Will anyone else be joining your video visit? No        Video-Visit Details    Type of service:  Video Visit    Video Start Time: 3:06  Video End Time: 3:58    Originating Location (pt. Location): Home    Distant Location (provider location):  [facility]     Platform used for Video Visit: Clare Bolaños, PhD      Health Psychology                  Clinic    Department of Medicine  Anne Marie Jimenez, PhD, LP [phone]                          Clinics and Surgery Center  [facility] Loco Hernandez, PhD, LP [phone]                  3rd Floor  [place] Mail Code [address]   Gee Flood, PhD, ABPP, LP [phone]      [facility], [address],  Grace Bolaños,  PhD, LP [phone]            [place]  [address]  [place] [address] Maryann Grady, PhD, LP " [phone]     Confidential Summary of Standard Psychodiagnostic Evaluation*    Referral Source:  Jitendra Pacheco PA-C, [facility] gastroenterology and IBD clinic    Reason for Referral:  Coping with bowel irregularity    Sources of Information:  Information was obtained from a clinical interview with the patient, review of available medical records, and administration of psychological assessments.     History of Presenting Concerns:  Chiquis King is a 40 year old   female who has been seen in the GI clinic for a change in bowel habits, which have included alternating between constipation and more frequent bowel movements (3 stools a week to 3 stools a day).  Current management strategies include magnesium oxide 800 mg daily.  Symptoms emerged beginning during her first pregnancy and shortly thereafter, with constipation originating throughout various pregnancy and postpartum periods.  She stated that she can maintain symptoms through a highly restrictive diet and magnesium oxide, but feels like this is too much to do to maintain bowel regularity.  She stated that triggers include fast food but is unsure of other triggers.  When asked to describe the connection between stress and symptoms, she cannot identify a strong connection.  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  She also reported challenges with parenting her 7-year-old daughter with ADHD.  Noted difficulty with finding effective stress management strategies and relaxation.  GI symptoms negatively impact her activity level and are painful at times.  Overall she feels discouraged with being less capable of activity than she would like to be.    Medical History:    Past Medical History:   Diagnosis Date     Anxiety 2006?     Arthritis 2016    low back/hip area     Bone and " joint disord back, pelvis, leg complicat preg, childb, puerp 200?: thoracic scoliosis    2018: Diastasis Recti; 2013 tendonitis; 2018 carpeltunnel     Chronic constipation 2014?     Chronic diarrhea 2014?    consistently occasional     Depressive disorder 2012?     Endocrine problem since adolescence    clammy hands     Fecal incontinence 2018?    from hemorrhoids     Fracture 1991?    arm     Head injury 1994/5 8th grade    softball hit hard on forehead     History of diabetes mellitus 2012&2014    pregnancy     Migraines 2010?     Other heart disorders in diseases classified elsewhere 1986?    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    Since first pregnancy?     Urinary tract infection 2012 and 1 other since?       No past surgical history on file.    Current Outpatient Medications   Medication     CHASTE TREE PO     Cholecalciferol (VITAMIN D-3) 125 MCG (5000 UT) TABS     UNABLE TO FIND     UNABLE TO FIND     vitamin (B COMPLEX-C) tablet     No current facility-administered medications for this visit.        Psychiatric History:  Significant for history of postpartum depression and anxiety.  Previously treated with Zoloft from December 2018 through July 2019 as prescribed by OB/GYN.  No history of previous engagement in psychotherapy or formal psychiatric treatment.    Current psychiatric symptoms were reported to include moderate levels of anxiety including frequently feeling anxious, difficulty worrying, trouble relaxing, restlessness, irritability, and feeling afraid something awful might happen.  She also endorsed the presence of anhedonia, depressed mood, trouble sleeping, fatigue, negative thoughts about herself, and concentration difficulties.  Overall she stated she feels as if she has not been healthy for the entire duration of her marriage, which places stress on her marriage.  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    Substance Use History:  Not assessed in detail but per chart review there does not appear to be a history of chemical dependency problems or treatment    Social History:  She grew up in [place] with her mother, father, and 5 siblings.  She was the fourth of 6 children.  She described her upbringing as tough and that while she understood many people cared for her she did not feel loved.  She attributed this to not having a good relationship with her father who likely had ADHD or was on the autism spectrum.  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  Is  with 3 children, ages 7, 5, and 17 months.  Highest level of education is a bachelor's degree.    Psychological Assessment:  The patient completed the following battery of assessments during this psychological evaluation: World Health Organization Disability Assessment Schedule 2.0 12-item (WHODAS), Patient Health Questionnaire-9 (PHQ-9), Generalized Anxiety Disorder-7 screener (MARY-7), and the CAGE Questionnaire Adapted to Include Drugs (CAGE-AID).    The WHODAS measures disability and functional impairment due to health conditions including diseases, illnesses, injuries, mental or emotional problems, and problems with alcohol or drugs. The possible range of scores is 12-60 and higher scores indicate higher levels of disability.   No flowsheet data found.  Not administered due to time constraints    The PHQ-9 is an instrument for screening, diagnosing, monitoring and measuring the severity of depression. Scores of 5, 10, 15, and 20 represent cutpoints for mild, moderate, moderately severe and severe depression, respectively.   PHQ-9 SCORE 5/15/2020   PHQ-9 Total Score 13       The MARY-7 is an instrument for screening, diagnosing, monitoring and measuring the severity of anxiety. Scores of 5, 10, and 15 represent cutpoints for  mild, moderate, and severe anxiety, respectively.  MARY-7 SCORE 5/15/2020   Total Score 14       The CAGE-AID questionnaire is used to screen for alcohol or drug abuse and dependence in adults. A CAGE-AID score  > 1 is a positive screen, suggesting further discussion is needed to determine if evaluation for alcohol or substance abuse is appropriate. A score > 2 is considered clinically significant, suggesting further evaluation of alcohol or substance-related problems is indicated.  No flowsheet data found.  Not administered due to time constraints    Mental Status Examination:  Appearance/Behavior/Orientation: Patient was appropriately groomed and dressed, and demonstrated good eye contact. Alert and oriented to person, place, time, and situation. No evidence of psychomotor agitation.     Cooperation/Reliability: Patient was open and cooperative throughout the session.    Speech/Language: Speech was clear, coherent, and of normal rate, rhythm and volume.   Thought Form: Overall logical and organized.   Thought Content: Appropriate to interview and situation.  Cognition/Memory: Not formally assessed, but no difficulties apparent upon interview.   Attention/Concentration: Good throughout interview.    Fund of knowledge: Consistent with age and level of education.    Abstract reasoning: Not assessed.   Judgment: Intact.    Mood/Affect: Mood anxious; appropriate range of affect.    Insight/Motivation: Fair, good  Suicide/Assault: Patient denies suicidal or assaultive ideation, plan, or intent.    Impression:  Chiquis King is a 40 year old female with longstanding history of alterations in bowel patterns.  Changes may have originated with pregnancy and postpartum.;  Sleep deprivation and stress of that.  May have also contributed to ongoing nature of these concerns.  Has limited insight into the connection between stress and symptoms; however, she describes herself as a person with higher levels of stress.  Is likely  a good candidate for brain gut psychotherapy.    Diagnosis:  Adjustment disorder with mixed anxiety and depressed mood    Recommendation/Plan:  Recommended a course of cognitive behavioral therapy focused on functional GI concerns to provide a brain get psychotherapy for her symptoms.  Provided rationale of treatment and overview of course of care.  She agreed with treatment recommendations.  A treatment plan will be completed at the next session.     Grace Bolaños, PhD,   Clinical Health Psychologist    *In accordance with the Rules of the [place] Board of [employer], it is noted that psychological descriptions and scientific procedures underlying psychological evaluations have limitations.  Absolute predictions cannot be made based on information in this report.    *no letter    This note was completed using Dragon voice recognition software.  Although reviewed after completion, some word and grammatical errors may occur.

## 2020-05-16 ASSESSMENT — ANXIETY QUESTIONNAIRES: GAD7 TOTAL SCORE: 14

## 2020-05-18 ENCOUNTER — VIRTUAL VISIT (OUTPATIENT)
Dept: GASTROENTEROLOGY | Facility: CLINIC | Age: 40
End: 2020-05-18
Payer: COMMERCIAL

## 2020-05-18 DIAGNOSIS — K58.9 IBS (IRRITABLE BOWEL SYNDROME): Primary | ICD-10-CM

## 2020-05-18 DIAGNOSIS — Z71.3 NUTRITIONAL COUNSELING: ICD-10-CM

## 2020-05-18 NOTE — PROGRESS NOTES
"[facility] Outpatient Medical Nutrition Therapy      Chiquis King is a 40 year old female who is being evaluated via a billable telephone visit.       The patient has been notified of following:      \"This telephone visit will be conducted via a call between you and your physician/provider. We have found that certain health care needs can be provided without the need for a physical exam.  This service lets us provide the care you need with a short phone conversation.  If a prescription is necessary we can send it directly to your pharmacy.  If lab work is needed we can place an order for that and you can then stop by our lab to have the test done at a later time.     Telephone visits are billed at different rates depending on your insurance coverage. During this emergency period, for some insurers they may be billed the same as an in-person visit.  Please reach out to your insurance provider with any questions.     If during the course of the call the physician/provider feels a telephone visit is not appropriate, you will not be charged for this service.\"     Patient has given verbal consent for Telephone visit? YES      How would you like to obtain your AVS? NA     Phone call duration: 60 minutes        GI CLINIC VISIT     3:30 p.m. to 4:30 p.m.     Adrian Lang, PhD, RD    Additional provider notes:  Referring Physician:  Junior  Reason for RD Visit: IBD     Nutrition Plan: Soft texture, low fat diet that emphasizes sources of soluble fiber. Utilize the AID-IBD food list to help guide food choices.    Recommendations for MD/Provider to order: None at this time.    Malnutrition Diagnosis: Patient does not meet the criteria necessary for diagnosing malnutrition    Nutrition Assessment:  Patient is here for intial visit with Registered Dietitian (RD).  Patient is a 40 year old female who presents for change in bowel pattern. She was seen by Jitendra Pacheco PA-C 5/4/2020 at which time she reported some success with a " low FODMAP diet. In the past 6 months she reports a weight loss due to 3 stomach bugs this winter. ~10 pounds of this was unintentional and the additional 10# intentional. Altered bowel pattern started back with first pregnancy when placed on bedrest and became constipated. She was also diagnosed with gestational diabetes, which she controlled with diet. Feels that she lost almost 20 pounds since this winter (~158-159#), but has since gained some back (165# this morning). Feels like having to eat too hard to find foods that work for her.     Past Surgical History: None    Symptom Review  1. Nausea/vomiting? Yes: Occasionally nausea, but has subsided in the past couple months  2. Heartburn? No  3. Bloating? Yes: Both  4. Belching? N/A  5. Feeling full quickly? No  6. Decreased appetite? No  7. Weight loss/gain? Yes: Recent weight loss, but has since regained  8. Constipation/Diarrhea? Yes: Within the last month ~2 BMs in the moring and then loose stool in the afternoon  9. Urgency? Yes: All of them  10. Incomplete Bowel Emptying? Yes: Feels like this is maybe getting better  11. Abdominal pain/pain with or without eating? Yes: Both  12. Feeling tired? Yes: Since started losing weight and doing cardio this was getting better    IBD-Q Score History  IBDQ Score Date IBDQ - Total Score  (Higher score better)   6/12/2019 38       Dietary beliefs and Practices  1.  Did you modify your diet leading up to diagnosis OR Have you modified your diet since diagnosis?  Yes Has trialed gluten free, GAPS, and low FODMAP  8.  Have you received prior advice on diet?  Yes   A. If so, source? GAPS and gluten free diet from primary care physician  9.  Do you follow a specific diet?  No   A. Which one(s)?  N/A   B. Did/Do you find it beneficial?  N/A    I. If able to articulate, what specifically is the benefit? N/A  10.  Do you take any vitamin, mineral, or herbal supplements? Yes: Chaste tree, fish oil, Mg oxide, vitamin D, align  probiotic, vitamin B6  11.  Do you use any calorie/protein supplements?  Yes: Sometimes uses hemp protein powder in waffle recipe      Diet Recall:  (Typical Day)  Meal Name Time Food    Breakfast  At least 2 strong cups coffee     Low FODMAP waffle recipe; Today was [employer] protein bar (5 g fiber, chicory root as ingredient)        Lunch  Tries to eat vegetables and protein (tends to be a salad; will sometimes add chicken or lunch meat)     Today sausage, potato, and butternut squash soup AND blue corn tortilla chips AND cashews, almonds, and craisins        Dinner  Ground beef, chicken, and italian sausage as protein with starch and vegetable        Snacks  N/A   Beverages  N/A   Alcohol   N/A   *Doesn't think dairy is tolerated. Has tried almond and oatmilk, but they seem to be gas producing    Frequency of eating/taking out meals: 1-2x/wk; chik-ermias-a on gluten free bun OR salmon at [employer] OR stir love at [employer]  Food access/availability: Fine  Food preparation confidence/abilities: Fine    Anthropometrics:   Height: Data Unavailable  Weight: Data Unavailable   BMI: There is no height or weight on file to calculate BMI.    Weight History:  Wt Readings from Last 10 Encounters:   19 83.1 kg (183 lb 3.2 oz)     Usual Weight: 180#, but think that ideal weight is closer to 130-140  Weight change in past 6 months: Experienced an ~20 pound weight loss this winter, but has since regained some of the weight    Labs: Reviewed  Pertinent Medications/vitamin and mineral supplements:   Current Outpatient Medications   Medication     CHASTE TREE PO     Cholecalciferol (VITAMIN D-3) 125 MCG (5000 UT) TABS     UNABLE TO FIND     UNABLE TO FIND     vitamin (B COMPLEX-C) tablet     No current facility-administered medications for this visit.        Food Allergies: N/A  Food Intolerances: N/A  Physical Activity: N/A  Estimated Nutrition Needs based on most recent body weight of 83.1 [id number] calories  (20-25 kcals/kg), 65 g protein (0.8 g/kg), 2000 ml fluids (~1 ml/kcal or total fluids per MD).     MALNUTRITION:  % Weight Loss:  Weight loss does not meet criteria for malnutrition   % Intake:  No decreased intake noted  Subcutaneous Fat Loss:  None observed  Muscle Loss: None observed  Fluid Retention:  None noted    Nutrition Diagnosis:    Food and nutrition related knowledge deficit related to IBD as evidenced by need for diet education.    Nutrition Prescription: Regular    Nutrition Intervention:    Nutrition Education/Counseling:  Discussed diet and symptom history. Reviewed components of a more universally tolerated diet: soft texture, low fat diet with an emphasis on soluble fiber. Introduced the IBD Anti-inflammatory diet food list. Discussed that certain aspects of the diet (e.g. gluten free, dairy free) may be overly restrictive, but that the food list seems to provide a reasonable texture-based diet advancement that can help guide food choices in the next 3-4 weeks. Discussed increasing intake of prebiotic/soluble fiber containing foods (e.g. ground flaxseed, jovani seeds) and consuming a more consistent intake of fiber throughout the day. Discussed that a low FODMAP diet can be pursued in the future if these diet changes are insufficient.    Educational Materials Provided: IBD-AID food list  Patient verbalized understanding of education provided. See all recommendations under Goals.    Goals:  1. Soft texture, low fat diet that emphasizes sources of soluble fiber. Utilize the AID-IBD food list to help guide food choices.    Nutrition Monitoring and Evaluation: Will monitor adherence to nutrition recommendations at future RD visits.     Further Medical Nutrition Therapy: Yes  Next Appointment (if applicable): 2-4 weeks  Patient was encouraged to call/contact RD with any further questions.

## 2020-05-18 NOTE — PATIENT INSTRUCTIONS
Braden Edmondson,    It was great meeting you today. Below is a summary of what we discussed:    1. Soft texture, low fat diet that emphasizes sources of soluble fiber. Utilize the AID-IBD food list to help guide food choices.    Best regards,  Adrian Lang, PhD, RD

## 2020-06-01 ENCOUNTER — VIRTUAL VISIT (OUTPATIENT)
Dept: GASTROENTEROLOGY | Facility: CLINIC | Age: 40
End: 2020-06-01
Payer: COMMERCIAL

## 2020-06-01 DIAGNOSIS — R19.4 CHANGE IN BOWEL HABITS: ICD-10-CM

## 2020-06-01 DIAGNOSIS — Z71.3 NUTRITIONAL COUNSELING: ICD-10-CM

## 2020-06-01 DIAGNOSIS — K58.9 IBS (IRRITABLE BOWEL SYNDROME): Primary | ICD-10-CM

## 2020-06-01 NOTE — PATIENT INSTRUCTIONS
Braden Edmondson,    It was great meeting with you again today. Below is a summary of what we discussed:    1. Soluble fiber rich, low fat    2. Small, frequent meals    3. Implement modified low FODMAP diet if symptoms persist    Best regards,  Adrian Lang, PhD, RD

## 2020-06-01 NOTE — LETTER
"    6/1/2020         RE: Chiquis King  [address] [place] [address]        Dear Colleague,    Thank you for referring your patient, Chiquis King, to the [facility] IBD [facility]. Please see a copy of my visit note below.    [facility] Outpatient Medical Nutrition Therapy    Chiquis King is a 40 year old female who is being evaluated via a billable video visit.      The patient has been notified of following:     \"This video visit will be conducted via a call between you and your physician/provider. We have found that certain health care needs can be provided without the need for an in-person physical exam.  This service lets us provide the care you need with a video conversation.  If a prescription is necessary we can send it directly to your pharmacy.  If lab work is needed we can place an order for that and you can then stop by our lab to have the test done at a later time.    Video visits are billed at different rates depending on your insurance coverage.  Please reach out to your insurance provider with any questions.    If during the course of the call the physician/provider feels a video visit is not appropriate, you will not be charged for this service.\"    Patient has given verbal consent for Video visit? Yes    How would you like to obtain your AVS? NA    Patient would like the video invitation sent by: Text to cell phone: [phone]    Will anyone else be joining your video visit? No        Video-Visit Details    Type of service:  Video Visit    Video Start Time: 2:35 PM  Video End Time: 3:15 PM    Originating Location (pt. Location): Home    Distant Location (provider location):  [facility]     Platform used for Video Visit: Clare Lang, PhD, RD      Additional provider notes:  Referring Physician: Junior  Reason for RD Visit: IBS     Nutrition Plan: Continue soluble fiber rich, soft texture, low fat diet. Incorporate small " frequent meals to see if this helps address abdominal pain. Consider addition of modified low FODMAP diet if symptoms persist.    Recommendations for MD/Provider to order: None at this time.    Malnutrition Diagnosis: Patient does not meet the criteria necessary for diagnosing malnutrition    Nutrition Assessment:  Patient is here for intial visit with Registered Dietitian (KHARI).  Patient is a 40 year old female who presents for change in bowel pattern. She was seen by Jitendra Pacheco PA-C 5/4/2020 at which time she reported some success with a low FODMAP diet. In the past 6 months she reports a weight loss due to 3 stomach bugs this winter. ~10 pounds of this was unintentional and the additional 10# intentional. Altered bowel pattern started back with first pregnancy when placed on bedrest and became constipated. She was also diagnosed with gestational diabetes, which she controlled with diet. Feels like having to eat too hard to find foods that work for her. During our last visit 5/18/2020, we implemented a soft texture, low fat diet that emphasizes sources of soluble fiber. Started immediately following the visit and noted that the first week was a lot of nausea and headaches. Bowel pattern remains inconsistent. Sleep pattern has been altered. In the past 10 days, has had ~2 BMs/day and a couple of days. She feels that perhaps symptoms are trending in the right direction recently.    Past Surgical History: None    Symptom Review  1. Nausea/vomiting? Felt like had nausea off and on the first week  2. Heartburn? No  3. Bloating? 2 nights ago had uniquely bad gas. Bloating might be better  4. Belching? N/A  5. Feeling full quickly? No  6. Decreased appetite? No  7. Weight loss/gain? Has lost ~7 pounds since we last met  8. Constipation/Diarrhea? Yes: In the last 2 weeks ~2 BMs in the morning. And not a BM necessarily every day    IBD-Q Score History  IBDQ Score Date IBDQ - Total Score  (Higher score better)   6/12/2019  38       Dietary beliefs and Practices  1.  Did you modify your diet leading up to diagnosis OR Have you modified your diet since diagnosis?  Yes Has trialed gluten free, GAPS, and low FODMAP  8.  Have you received prior advice on diet?  Yes   A. If so, source? GAPS and gluten free diet from primary care physician  9.  Do you follow a specific diet?  No   A. Which one(s)?  N/A   B. Did/Do you find it beneficial?  N/A    I. If able to articulate, what specifically is the benefit? N/A  10.  Do you take any vitamin, mineral, or herbal supplements? Yes: Chaste tree, fish oil, Mg oxide, vitamin D, align probiotic, vitamin B6  11.  Do you use any calorie/protein supplements?  Yes: Sometimes uses hemp protein powder in waffle recipe      Diet Recall:  (Typical Day)  Meal Name Time Food 6/1 Food    Breakfast  2-3 cups coffee At least 2 strong cups coffee     Either smoothie with fruit and yogurt (<1/4 c) and hemp protein powder and ground flaxseed and jovani seeds OR Low FODMAP waffle recipe Low FODMAP waffle recipe; Today was nature valley protein bar (5 g fiber, chicory root as ingredient)         Lunch  A lot of pad [place] OR fried rice (tries to have more volume of vegetables than grain) Tries to eat vegetables and protein (tends to be a salad; will sometimes add chicken or lunch meat)      Today sausage, potato, and butternut squash soup AND blue corn tortilla chips AND cashews, almonds, and craisins         Dinner  Stir love Ground beef, chicken, and italian sausage as protein with starch and vegetable         Snacks  Apple cake from AID-IBD website N/A   Beverages   N/A   Alcohol   Red wine well tolerated N/A   *Doesn't think dairy is tolerated. Has tried almond and oatmilk, but they seem to be gas producing.   **Did stop eating pineapple and seems to be associated with headaches    Frequency of eating/taking out meals: 1-2x/wk; chik-ermias-a on gluten free bun OR salmon at [employer] OR stir love at [employer]  Excep Apps  access/availability: Fine  Food preparation confidence/abilities: Fine    Anthropometrics:   Height: Data Unavailable  Weight (patient reported): 158.8 pounds  BMI: There is no height or weight on file to calculate BMI.    Weight History:  Wt Readings from Last 10 Encounters:   19 83.1 kg (183 lb 3.2 oz)     Usual Weight: 180#, but think that ideal weight is closer to 130-140  Weight change in past 6 months: Experienced an ~7 pound weight loss in the past couple of weeks. She reports a weight of 158.8 pounds down from ~166 at the time of our last visit.    Labs: Reviewed  Pertinent Medications/vitamin and mineral supplements:   Current Outpatient Medications   Medication     CHASTE TREE PO     Cholecalciferol (VITAMIN D-3) 125 MCG (5000 UT) TABS     UNABLE TO FIND     UNABLE TO FIND     vitamin (B COMPLEX-C) tablet     No current facility-administered medications for this visit.        Food Allergies: N/A  Food Intolerances: N/A  Physical Activity: N/A  Estimated Nutrition Needs based on most recent body weight of 83.1 k5340-8509 calories (20-25 kcals/kg), 65 g protein (0.8 g/kg), 2000 ml fluids (~1 ml/kcal or total fluids per MD).     MALNUTRITION:  % Weight Loss:  Weight loss does not meet criteria for malnutrition   % Intake:  No decreased intake noted  Subcutaneous Fat Loss:  None observed  Muscle Loss: None observed  Fluid Retention:  None noted    Nutrition Diagnosis:    Food and nutrition related knowledge deficit related to IBD as evidenced by need for diet education.    Nutrition Prescription: Regular    Nutrition Intervention:    Nutrition Education/Counseling:  Discussed diet and symptom history. Discussed continuation of recent diet changes. Plan to add the modification of small frequent meals to specifically attempt to address abdominal pain. We also discussed incorporating a modified low FODMAP diet if feels these diet changes are insufficient.    Educational Materials Provided: IBD-AID food  list  Patient verbalized understanding of education provided. See all recommendations under Goals.    Goals:  1. Soluble fiber rich, low fat    2. Small, frequent meals    3. Implement modified low FODMAP diet if symptoms persist    Nutrition Monitoring and Evaluation: Will monitor adherence to nutrition recommendations at future RD visits.     Further Medical Nutrition Therapy: Yes  Next Appointment (if applicable): 6 weeks  Patient was encouraged to call/contact RD with any further questions.

## 2020-06-01 NOTE — PROGRESS NOTES
"[facility] Outpatient Medical Nutrition Therapy    Chiquis King is a 40 year old female who is being evaluated via a billable video visit.      The patient has been notified of following:     \"This video visit will be conducted via a call between you and your physician/provider. We have found that certain health care needs can be provided without the need for an in-person physical exam.  This service lets us provide the care you need with a video conversation.  If a prescription is necessary we can send it directly to your pharmacy.  If lab work is needed we can place an order for that and you can then stop by our lab to have the test done at a later time.    Video visits are billed at different rates depending on your insurance coverage.  Please reach out to your insurance provider with any questions.    If during the course of the call the physician/provider feels a video visit is not appropriate, you will not be charged for this service.\"    Patient has given verbal consent for Video visit? Yes    How would you like to obtain your AVS? NA    Patient would like the video invitation sent by: Text to cell phone: [phone]    Will anyone else be joining your video visit? No        Video-Visit Details    Type of service:  Video Visit    Video Start Time: 2:35 PM  Video End Time: 3:15 PM    Originating Location (pt. Location): Home    Distant Location (provider location):  [facility]STROENTEROLOG[facility] IBD CLINIC     Platform used for Video Visit: Clare Lang, PhD, RD      Additional provider notes:  Referring Physician: Junior  Reason for RD Visit: IBS     Nutrition Plan: Continue soluble fiber rich, soft texture, low fat diet. Incorporate small frequent meals to see if this helps address abdominal pain. Consider addition of modified low FODMAP diet if symptoms persist.    Recommendations for MD/Provider to order: None at this time.    Malnutrition Diagnosis: Patient does not meet the criteria " necessary for diagnosing malnutrition    Nutrition Assessment:  Patient is here for intial visit with Registered Dietitian (KHARI).  Patient is a 40 year old female who presents for change in bowel pattern. She was seen by Jitendra Pacheco PA-C 5/4/2020 at which time she reported some success with a low FODMAP diet. In the past 6 months she reports a weight loss due to 3 stomach bugs this winter. ~10 pounds of this was unintentional and the additional 10# intentional. Altered bowel pattern started back with first pregnancy when placed on bedrest and became constipated. She was also diagnosed with gestational diabetes, which she controlled with diet. Feels like having to eat too hard to find foods that work for her. During our last visit 5/18/2020, we implemented a soft texture, low fat diet that emphasizes sources of soluble fiber. Started immediately following the visit and noted that the first week was a lot of nausea and headaches. Bowel pattern remains inconsistent. Sleep pattern has been altered. In the past 10 days, has had ~2 BMs/day and a couple of days. She feels that perhaps symptoms are trending in the right direction recently.    Past Surgical History: None    Symptom Review  1. Nausea/vomiting? Felt like had nausea off and on the first week  2. Heartburn? No  3. Bloating? 2 nights ago had uniquely bad gas. Bloating might be better  4. Belching? N/A  5. Feeling full quickly? No  6. Decreased appetite? No  7. Weight loss/gain? Has lost ~7 pounds since we last met  8. Constipation/Diarrhea? Yes: In the last 2 weeks ~2 BMs in the morning. And not a BM necessarily every day    IBD-Q Score History  IBDQ Score Date IBDQ - Total Score  (Higher score better)   6/12/2019 39       Dietary beliefs and Practices  1.  Did you modify your diet leading up to diagnosis OR Have you modified your diet since diagnosis?  Yes Has trialed gluten free, GAPS, and low FODMAP  8.  Have you received prior advice on diet?  Yes   A. If so,  source? GAPS and gluten free diet from primary care physician  9.  Do you follow a specific diet?  No   A. Which one(s)?  N/A   B. Did/Do you find it beneficial?  N/A    I. If able to articulate, what specifically is the benefit? N/A  10.  Do you take any vitamin, mineral, or herbal supplements? Yes: Chaste tree, fish oil, Mg oxide, vitamin D, align probiotic, vitamin B6  11.  Do you use any calorie/protein supplements?  Yes: Sometimes uses hemp protein powder in waffle recipe      Diet Recall:  (Typical Day)  Meal Name Time Food 6/1 Food    Breakfast  2-3 cups coffee At least 2 strong cups coffee     Either smoothie with fruit and yogurt (<1/4 c) and hemp protein powder and ground flaxseed and jovani seeds OR Low FODMAP waffle recipe Low FODMAP waffle recipe; Today was nature valley protein bar (5 g fiber, chicory root as ingredient)         Lunch  A lot of pad [place] OR fried rice (tries to have more volume of vegetables than grain) Tries to eat vegetables and protein (tends to be a salad; will sometimes add chicken or lunch meat)      Today sausage, potato, and butternut squash soup AND blue corn tortilla chips AND cashews, almonds, and craisins         Dinner  Stir love Ground beef, chicken, and italian sausage as protein with starch and vegetable         Snacks  Apple cake from AID-IBD website N/A   Beverages   N/A   Alcohol   Red wine well tolerated N/A   *Doesn't think dairy is tolerated. Has tried almond and oatmilk, but they seem to be gas producing.   **Did stop eating pineapple and seems to be associated with headaches    Frequency of eating/taking out meals: 1-2x/wk; chik-ermias-a on gluten free bun OR salmon at [employer] OR stir love at [employer]  Food access/availability: Fine  Food preparation confidence/abilities: Fine    Anthropometrics:   Height: Data Unavailable  Weight (patient reported): 158.8 pounds  BMI: There is no height or weight on file to calculate BMI.    Weight History:  Wt Readings from  Last 10 Encounters:   19 83.1 kg (183 lb 3.2 oz)     Usual Weight: 180#, but think that ideal weight is closer to 130-140  Weight change in past 6 months: Experienced an ~7 pound weight loss in the past couple of weeks. She reports a weight of 158.8 pounds down from ~166 at the time of our last visit.    Labs: Reviewed  Pertinent Medications/vitamin and mineral supplements:   Current Outpatient Medications   Medication     CHASTE TREE PO     Cholecalciferol (VITAMIN D-3) 125 MCG (5000 UT) TABS     UNABLE TO FIND     UNABLE TO FIND     vitamin (B COMPLEX-C) tablet     No current facility-administered medications for this visit.        Food Allergies: N/A  Food Intolerances: N/A  Physical Activity: N/A  Estimated Nutrition Needs based on most recent body weight of 83.1 k1178-1894 calories (20-25 kcals/kg), 65 g protein (0.8 g/kg), 2000 ml fluids (~1 ml/kcal or total fluids per MD).     MALNUTRITION:  % Weight Loss:  Weight loss does not meet criteria for malnutrition   % Intake:  No decreased intake noted  Subcutaneous Fat Loss:  None observed  Muscle Loss: None observed  Fluid Retention:  None noted    Nutrition Diagnosis:    Food and nutrition related knowledge deficit related to IBD as evidenced by need for diet education.    Nutrition Prescription: Regular    Nutrition Intervention:    Nutrition Education/Counseling:  Discussed diet and symptom history. Discussed continuation of recent diet changes. Plan to add the modification of small frequent meals to specifically attempt to address abdominal pain. We also discussed incorporating a modified low FODMAP diet if feels these diet changes are insufficient.    Educational Materials Provided: IBD-AID food list  Patient verbalized understanding of education provided. See all recommendations under Goals.    Goals:  1. Soluble fiber rich, low fat    2. Small, frequent meals    3. Implement modified low FODMAP diet if symptoms persist    Nutrition Monitoring and  Evaluation: Will monitor adherence to nutrition recommendations at future RD visits.     Further Medical Nutrition Therapy: Yes  Next Appointment (if applicable): 6 weeks  Patient was encouraged to call/contact RD with any further questions.

## 2020-06-04 ENCOUNTER — TELEPHONE (OUTPATIENT)
Dept: GASTROENTEROLOGY | Facility: CLINIC | Age: 40
End: 2020-06-04

## 2020-06-08 ENCOUNTER — TELEPHONE (OUTPATIENT)
Dept: GASTROENTEROLOGY | Facility: CLINIC | Age: 40
End: 2020-06-08

## 2020-06-09 ENCOUNTER — TELEPHONE (OUTPATIENT)
Dept: GASTROENTEROLOGY | Facility: CLINIC | Age: 40
End: 2020-06-09

## 2020-06-19 ENCOUNTER — VIRTUAL VISIT (OUTPATIENT)
Dept: GASTROENTEROLOGY | Facility: CLINIC | Age: 40
End: 2020-06-19
Payer: COMMERCIAL

## 2020-06-19 DIAGNOSIS — F43.23 ADJUSTMENT DISORDER WITH MIXED ANXIETY AND DEPRESSED MOOD: Primary | ICD-10-CM

## 2020-06-19 NOTE — LETTER
Date:July 13, 2020      Provider requested that no letter be sent. Do not send.       [facility] Health Information

## 2020-06-19 NOTE — PROGRESS NOTES
"The patient has been notified of the following:      \"We have found that certain health care needs can be provided without the need for a face to face visit.  This service lets us provide the care you need with a phone conversation.       I will have full access to your [place] medical record during this entire phone call.   I will be taking notes for your medical record.      Since this is like an office visit, we will bill your insurance company for this service.       There are potential benefits and risks of telephone visits (e.g. limits to patient confidentiality) that differ from in-person visits.?  Confidentiality still applies for telephone services, and nobody will record the visit.  It is important to be in a quiet, private space that is free of distractions (including cell phone or other devices) during the visit.??      If during the course of the call I believe a telephone visit is not appropriate, you will not be charged for this service\"     Consent has been obtained for this service by care team member: Yes     Attempted [place] visit, but due to video connection issues, transitioned to phone.    Health Psychology                  Clinic    Department of Medicine  Anne Marie Jimenez, PhD, LP [phone]                          Clinics and Surgery Center  [facility] Loco Hernandez, PhD, LP [phone]                  3rd Floor  [place] Mail [address]   Gee Flood, PhD, ABPP, LP [phone]     [address] [place] [address], [address],  Grace Bolaños,  PhD, LP [phone]            [place] [address] Maryann Grady, PhD, LP [phone]    Health Psychology Follow-Up Note    SUBJECTIVE:  Chiquis King was seen for individual psychotherapy. Reviewed emotional and physical health since our last session. The patient reported that she is struggling with identifying links between food and GI symptoms, particularly gas. Has been working with GI " dietitian and is trying low FODMAP. Endorses stress related to her daughter's recent wrist fracture. Helps that school has ended and doesn't have to manage this. Endorses belief that more than 1 food is a trigger for symptoms rather than stress or sleep disturbance. She notices lower activity as a way to protect against flaring GI symptoms, which results in guilt. Endorsed long standing history of desire for seeking approval/others' thoughts, especially with regard to body image.     [place] today focused on creating a treatment plan, providing psychoeducation about the brain-gut connection, and building rapport.      A treatment plan was completed in collaboration with the patient. Goals include the followin. Improve relationship with food, as evidenced by finding other ways to relax or celebrate outside of food  2. Reduce sense of guilt and negative relationship with food, as evidenced by increase of mindful eating and reduction of negative thoughts of food  3. Improve body image     Provided psychoeducation about the brain-gut connection, including discussion of the role of the enteric and autonomic nervous systems in IBS and the biopsychosocial factors that contribute to IBS. Discussed the role of stress in exacerbating IBS symptoms, including the role of the sympathetic and parasympathetic nervous systems in stress response. Discussed how cognitive behavioral interventions, such as relaxation training (e.g., diaphragmatic breathing, guided imagery) and cognitive interventions, promote engagement of parasympathetic nervous system and interrupt stress response in order to improvement management of IBS.      Discussed the role in actively using relaxation training to activate the parasympathetic nervous system, or rest and digest response, to mitigate the effects of stress on the body. Provided instruction about how to practice diaphragmatic breathing and completed a guided practice in the session. Provided  resources to facilitate the patient's ability to practice this intervention outside of session, including a handout with instructions and encouragement to consider using the smart phone application, [id number]. Encouraged regular practice of diaphragmatic breathing (e.g., 5-10 minutes/day) to promote patient's development of this skill.     Created action plan for next session:  - Read or obtain bibliotherapy resources  - Practice diaphragmatic breathing (e.g., 3-10 minutes/day)      OBJECTIVE:  Appearance/Behavior/Orientation: Alert and oriented to person, place, time, and situation.  Cooperation/Reliability: Patient was open and cooperative throughout the session.    Speech/Language: Speech was clear, coherent, and of normal rate, rhythm and volume.   Thought Form: Overall logical and organized.   Judgment: Intact.    Mood/Affect: Mood anxious; appropriate range of affect.    Insight/Motivation: Fair, good      ASSESSMENT:  Chiquis King is a 40 year old female with longstanding history of alterations in bowel patterns.  Changes may have originated with pregnancy and postpartum.;  Sleep deprivation and stress of that.  May have also contributed to ongoing nature of these concerns.  Has limited insight into the connection between stress and symptoms; however, she describes herself as a person with higher levels of stress.  Is likely a good candidate for brain gut psychotherapy.    DIAGNOSIS:  Adjustment disorder with mixed anxiety and depressed mood    PLAN:  RTC for continued psychotherapy.     Time in: 2:05p  Time out: 3:08p  Extended session due to complexity of case and length of interval.    Grace Bolaños, PhD,   Clinical Health Psychologist    Tx plan completed: 06/19/20  Tx plan due:  06/19/21    *no letter    This note was completed using Dragon voice recognition software.  Although reviewed after completion, some word and grammatical errors may occur.

## 2020-06-19 NOTE — LETTER
"    6/19/2020         RE: Chiquis King  [address] [place] [address]        Dear Colleague,    Thank you for referring your patient, Chiquis King, to the [facility] GASTROENTEROLOGY AND IBD CLINIC. Please see a copy of my visit note below.    The patient has been notified of the following:      \"We have found that certain health care needs can be provided without the need for a face to face visit.  This service lets us provide the care you need with a phone conversation.       I will have full access to your [facility] medical record during this entire phone call.   I will be taking notes for your medical record.      Since this is like an office visit, we will bill your insurance company for this service.       There are potential benefits and risks of telephone visits (e.g. limits to patient confidentiality) that differ from in-person visits.?  Confidentiality still applies for telephone services, and nobody will record the visit.  It is important to be in a quiet, private space that is free of distractions (including cell phone or other devices) during the visit.??      If during the course of the call I believe a telephone visit is not appropriate, you will not be charged for this service\"     Consent has been obtained for this service by care team member: Yes     Attempted [place] visit, but due to video connection issues, transitioned to phone.    Health Psychology                  Clinic    Department of Medicine  Anne Marie Jimenez, PhD, LP [phone]                          Clinics and Surgery Center  [facility] Loco Hernandez, PhD, LP [phone]                  3rd Floor  [place] Mail Code 741   Gee Flood, PhD, ABPP, LP [phone]     [facility], [address],  Grace Bolaños,  PhD, LP [phone]            [place] [address]  [place] [address] Maryann Grady, PhD, LP [phone]    Health Psychology Follow-Up Note    SUBJECTIVE:  Chiquis ORLANDO" Christine was seen for individual psychotherapy. Reviewed emotional and physical health since our last session. The patient reported that she is struggling with identifying links between food and GI symptoms, particularly gas. Has been working with GI dietitian and is trying low FODMAP. Endorses stress related to her daughter's recent wrist fracture. Helps that school has ended and doesn't have to manage this. Endorses belief that more than 1 food is a trigger for symptoms rather than stress or sleep disturbance. She notices lower activity as a way to protect against flaring GI symptoms, which results in guilt. Endorsed long standing history of desire for seeking approval/others' thoughts, especially with regard to body image.     [place] today focused on creating a treatment plan, providing psychoeducation about the brain-gut connection, and building rapport.      A treatment plan was completed in collaboration with the patient. Goals include the followin. Improve relationship with food, as evidenced by finding other ways to relax or celebrate outside of food  2. Reduce sense of guilt and negative relationship with food, as evidenced by increase of mindful eating and reduction of negative thoughts of food  3. Improve body image     Provided psychoeducation about the brain-gut connection, including discussion of the role of the enteric and autonomic nervous systems in IBS and the biopsychosocial factors that contribute to IBS. Discussed the role of stress in exacerbating IBS symptoms, including the role of the sympathetic and parasympathetic nervous systems in stress response. Discussed how cognitive behavioral interventions, such as relaxation training (e.g., diaphragmatic breathing, guided imagery) and cognitive interventions, promote engagement of parasympathetic nervous system and interrupt stress response in order to improvement management of IBS.      Discussed the role in actively using relaxation training  to activate the parasympathetic nervous system, or rest and digest response, to mitigate the effects of stress on the body. Provided instruction about how to practice diaphragmatic breathing and completed a guided practice in the session. Provided resources to facilitate the patient's ability to practice this intervention outside of session, including a handout with instructions and encouragement to consider using the smart phone application, [id number]. Encouraged regular practice of diaphragmatic breathing (e.g., 5-10 minutes/day) to promote patient's development of this skill.     Created action plan for next session:  - Read or obtain bibliotherapy resources  - Practice diaphragmatic breathing (e.g., 3-10 minutes/day)      OBJECTIVE:  Appearance/Behavior/Orientation: Alert and oriented to person, place, time, and situation.  Cooperation/Reliability: Patient was open and cooperative throughout the session.    Speech/Language: Speech was clear, coherent, and of normal rate, rhythm and volume.   Thought Form: Overall logical and organized.   Judgment: Intact.    Mood/Affect: Mood anxious; appropriate range of affect.    Insight/Motivation: Fair, good      ASSESSMENT:  Chiquis King is a 40 year old female with longstanding history of alterations in bowel patterns.  Changes may have originated with pregnancy and postpartum.;  Sleep deprivation and stress of that.  May have also contributed to ongoing nature of these concerns.  Has limited insight into the connection between stress and symptoms; however, she describes herself as a person with higher levels of stress.  Is likely a good candidate for brain gut psychotherapy.    DIAGNOSIS:  Adjustment disorder with mixed anxiety and depressed mood    PLAN:  RTC for continued psychotherapy.     Time in: 2:05p  Time out: 3:08p  Extended session due to complexity of case and length of interval.    Grace Bolaños, PhD,   Clinical Health Psychologist    Tx plan  completed: 06/19/20  Tx plan due:  06/19/21    *no letter    This note was completed using Dragon voice recognition software.  Although reviewed after completion, some word and grammatical errors may occur.          Again, thank you for allowing me to participate in the care of your patient.        Sincerely,        Grace Bolaños, PhD

## 2020-07-06 ENCOUNTER — VIRTUAL VISIT (OUTPATIENT)
Dept: GASTROENTEROLOGY | Facility: CLINIC | Age: 40
End: 2020-07-06
Payer: COMMERCIAL

## 2020-07-06 DIAGNOSIS — K58.9 IBS (IRRITABLE BOWEL SYNDROME): Primary | ICD-10-CM

## 2020-07-06 DIAGNOSIS — R19.4 CHANGE IN BOWEL HABITS: ICD-10-CM

## 2020-07-06 DIAGNOSIS — Z71.3 NUTRITIONAL COUNSELING: ICD-10-CM

## 2020-07-06 NOTE — LETTER
7/6/2020         RE: Chiquis King  [address]  [address] [place] [address]        Dear Colleague,    Thank you for referring your patient, Chiquis King, to the [facility] GASTROENTEROLOGY AND IBD CLINIC. Please see a copy of my visit note below.    [facility] Outpatient Medical Nutrition Therapy    Chiquis King is a 40 year old female who is being evaluated via a billable video visit.        Video-Visit Details    Type of service:  Video Visit    Video Start Time: 3:35 PM  Video End Time: 4:43 PM    Originating Location (pt. Location): Home     During this Video visit the patient is located in [place], patient verifies this as the location during the entirety of this Video visit.     Distant Location (provider location):  [facility] GASTROENTEROLOGY AND IBD CLINIC     Platform used for Video Visit: Clare Lang, PhD, RD      Additional provider notes:  Referring Physician: Junior  Reason for RD Visit: IBS     Nutrition Plan: Continue soluble fiber rich, soft texture, low fat diet. Modified low FODMAP diet with an emphasis on limiting foods high in fructose    Recommendations for MD/Provider to order: None at this time.    Malnutrition Diagnosis: Patient does not meet the criteria necessary for diagnosing malnutrition    Nutrition Assessment:  Patient is here for intial visit with Registered Dietitian (RD).  Patient is a 40 year old female who presents for change in bowel pattern. She was seen by Jitendra Pacheco PA-C 5/4/2020 at which time she reported some success with a low FODMAP diet. In the past 6 months she reports a weight loss due to 3 stomach bugs this winter. ~10 pounds of this was unintentional and the additional 10# intentional. Altered bowel pattern started back with first pregnancy when placed on bedrest and became constipated. She was also diagnosed with gestational diabetes, which she controlled with diet. Feels like having to work too hard to find foods that work for her. During our last  visit 5/18/2020, we implemented a soft texture, low fat diet that emphasizes sources of soluble fiber. Started immediately following the visit and noted that the first week was a lot of nausea and headaches. Since our last visit at the beginning of June, Chiquis Edmondson notes that she has been having a hard time making sense of diet and symptoms. She has been keeping a journal of both, but doesn't see any consistent patterns. She also notes that the month of June was a particularly stressful month so she is unsure what role that played in symptoms as well. Upon further discussion it sounds as if stools are often bristol scale 4. She notes a relationship between fiber intake and stool frequency, which we discussed was to be expected. Encouraged patient to share experience of intermittent left-sided abdominal pain at next visit with Jitendra Pacheco PA-C.    Past Surgical History: None    Symptom Review  Primary symptom of concern at this time is headache, which she feels may be diet related. She also reports nausea, abdominal pain, and gas.     IBD-Q Score History  IBDQ Score Date IBDQ - Total Score  (Higher score better)   6/12/2019 38       Dietary beliefs and Practices  1.  Did you modify your diet leading up to diagnosis OR Have you modified your diet since diagnosis?  Yes Has trialed gluten free, GAPS, and low FODMAP  8.  Have you received prior advice on diet?  Yes   A. If so, source? GAPS and gluten free diet from primary care physician  9.  Do you follow a specific diet?  No   A. Which one(s)?  N/A   B. Did/Do you find it beneficial?  N/A    I. If able to articulate, what specifically is the benefit? N/A  10.  Do you take any vitamin, mineral, or herbal supplements? Yes: Chaste tree, fish oil, Mg oxide, vitamin D, align probiotic, vitamin B6  11.  Do you use any calorie/protein supplements?  Yes: Sometimes uses hemp protein powder in waffle recipe      Diet Recall:  (Typical Day)  Meal Name Time Food 6/1 Food     Breakfast  2-3 cups coffee At least 2 strong cups coffee     Either smoothie with fruit and yogurt (<1/4 c) and hemp protein powder and ground flaxseed and jovani seeds OR Low FODMAP waffle recipe Low FODMAP waffle recipe; Today was [employer] protein bar (5 g fiber, chicory root as ingredient)         Lunch  A lot of pad [place] OR fried rice (tries to have more volume of vegetables than grain) Tries to eat vegetables and protein (tends to be a salad; will sometimes add chicken or lunch meat)      Today sausage, potato, and butternut squash soup AND blue corn tortilla chips AND cashews, almonds, and craisins         Dinner  Stir love Ground beef, chicken, and italian sausage as protein with starch and vegetable         Snacks  Apple cake from AID-IBD website N/A   Beverages   N/A   Alcohol   Red wine well tolerated N/A   *Doesn't think dairy is tolerated. Has tried almond and oatmilk, but they seem to be gas producing.   **Did stop eating pineapple and seems to be associated with headaches    Frequency of eating/taking out meals: 1-2x/wk; chik-ermias-a on gluten free bun OR salmon at [employer] (acquired by [employer]) OR stir love at [employer]  Food access/availability: Fine  Food preparation confidence/abilities: Fine    Anthropometrics:   Height: Data Unavailable  Recent Weight (patient reported): 158.8 pounds  BMI: There is no height or weight on file to calculate BMI.    Weight History:  Wt Readings from Last 10 Encounters:   06/13/19 83.1 kg (183 lb 3.2 oz)     Usual Weight: 180#, but think that ideal weight is closer to 130-140  Weight change in past 6 months: She believes she may have experienced a small weight loss, otherwise believes weight to have remained stable    Labs: Reviewed  Pertinent Medications/vitamin and mineral supplements:   Current Outpatient Medications   Medication     CHASTE TREE PO     Cholecalciferol (VITAMIN D-3) 125 MCG (5000 UT) TABS     UNABLE TO FIND     UNABLE TO FIND     vitamin (B COMPLEX-C) tablet      No current facility-administered medications for this visit.        Food Allergies: N/A  Food Intolerances: N/A  Physical Activity: N/A  Estimated Nutrition Needs based on most recent body weight of 83.1 k0431-0610 calories (20-25 kcals/kg), 65 g protein (0.8 g/kg), 2000 ml fluids (~1 ml/kcal or total fluids per MD).     MALNUTRITION:  % Weight Loss:  Weight loss does not meet criteria for malnutrition   % Intake:  No decreased intake noted  Subcutaneous Fat Loss:  None observed  Muscle Loss: None observed  Fluid Retention:  None noted    Nutrition Diagnosis:    Food and nutrition related knowledge deficit related to IBD as evidenced by need for diet education.    Nutrition Prescription: Regular    Nutrition Intervention:    Nutrition Education/Counseling:  Discussed diet and symptom history. Discussed continuation and modification of recent diet changes. Discussed certain aspects of the FODMAP diet based on review of diet and symptoms. Discussed the role that fructose and polyols play in increased luminal water, which may be explaining some of her symptoms. Encouraged limiting high fructose and polyol foods specifically at this time rather than restarting an entire low FODMAP diet. Discussed the prebiotic function that some of the fermentable types of fibers, which are considered high fodmap, serve in the gut.    Educational Materials Provided: No education materials provided at this time  Patient verbalized understanding of education provided. See all recommendations under Goals.    Goals:  1. Continue to primarily focus on grain recommendations in IBD-AID, while reintroducing gluten as comfortable    2. Limit intake of fructose and mannitol by limiting high fodmap fruits/vegetables at this time    3. Reinforced that IBD-AID diet is intended to provide a general illustration of a soft diet and that this is not an exhaustive list      Nutrition Monitoring and Evaluation: Will monitor adherence to nutrition  recommendations at future RD visits.     Further Medical Nutrition Therapy: Yes  Next Appointment (if applicable): 6 weeks  Patient was encouraged to call/contact RD with any further questions.                 Again, thank you for allowing me to participate in the care of your patient.        Sincerely,        Adrian Lang RD

## 2020-07-06 NOTE — PROGRESS NOTES
"[facility] Outpatient Medical Nutrition Therapy    Chiquis King is a 40 year old female who is being evaluated via a billable video visit.      The patient has been notified of following:     \"This video visit will be conducted via a call between you and your physician/provider. We have found that certain health care needs can be provided without the need for an in-person physical exam.  This service lets us provide the care you need with a video conversation.  If a prescription is necessary we can send it directly to your pharmacy.  If lab work is needed we can place an order for that and you can then stop by our lab to have the test done at a later time.    Video visits are billed at different rates depending on your insurance coverage.  Please reach out to your insurance provider with any questions.    If during the course of the call the physician/provider feels a video visit is not appropriate, you will not be charged for this service.\"    Patient has given verbal consent for Video visit? Yes    How would you like to obtain your AVS? NA    Patient would like the video invitation sent by: Text to cell phone: [phone]    Will anyone else be joining your video visit? No        Video-Visit Details    Type of service:  Video Visit    Video Start Time: 3:35 PM  Video End Time: 4:43 PM    Originating Location (pt. Location): Home     During this Video visit the patient is located in [place], patient verifies this as the location during the entirety of this Video visit.     Distant Location (provider location):  [facility] GASTROENTEROLOGY AND IBD CLINIC     Platform used for Video Visit: Clare Lang, PhD, RD      Additional provider notes:  Referring Physician: Junior  Reason for RD Visit: IBS     Nutrition Plan: Continue soluble fiber rich, soft texture, low fat diet. Modified low FODMAP diet with an emphasis on limiting foods high in fructose    Recommendations for MD/Provider to order: None at this " time.    Malnutrition Diagnosis: Patient does not meet the criteria necessary for diagnosing malnutrition    Nutrition Assessment:  Patient is here for intial visit with Registered Dietitian (KHARI).  Patient is a 40 year old female who presents for change in bowel pattern. She was seen by Jitendra Pacheco PA-C 5/4/2020 at which time she reported some success with a low FODMAP diet. In the past 6 months she reports a weight loss due to 3 stomach bugs this winter. ~10 pounds of this was unintentional and the additional 10# intentional. Altered bowel pattern started back with first pregnancy when placed on bedrest and became constipated. She was also diagnosed with gestational diabetes, which she controlled with diet. Feels like having to work too hard to find foods that work for her. During our last visit 5/18/2020, we implemented a soft texture, low fat diet that emphasizes sources of soluble fiber. Started immediately following the visit and noted that the first week was a lot of nausea and headaches. Since our last visit at the beginning of June, Chiquis Edmondson notes that she has been having a hard time making sense of diet and symptoms. She has been keeping a journal of both, but doesn't see any consistent patterns. She also notes that the month of June was a particularly stressful month so she is unsure what role that played in symptoms as well. Upon further discussion it sounds as if stools are often bristol scale 4. She notes a relationship between fiber intake and stool frequency, which we discussed was to be expected. Encouraged patient to share experience of intermittent left-sided abdominal pain at next visit with Jitendra Pacheco PA-C.    Past Surgical History: None    Symptom Review  Primary symptom of concern at this time is headache, which she feels may be diet related. She also reports nausea, abdominal pain, and gas.     IBD-Q Score History  IBDQ Score Date IBDQ - Total Score  (Higher score better)   6/12/2019 39        Dietary beliefs and Practices  1.  Did you modify your diet leading up to diagnosis OR Have you modified your diet since diagnosis?  Yes Has trialed gluten free, GAPS, and low FODMAP  8.  Have you received prior advice on diet?  Yes   A. If so, source? GAPS and gluten free diet from primary care physician  9.  Do you follow a specific diet?  No   A. Which one(s)?  N/A   B. Did/Do you find it beneficial?  N/A    I. If able to articulate, what specifically is the benefit? N/A  10.  Do you take any vitamin, mineral, or herbal supplements? Yes: Chaste tree, fish oil, Mg oxide, vitamin D, align probiotic, vitamin B6  11.  Do you use any calorie/protein supplements?  Yes: Sometimes uses hemp protein powder in waffle recipe      Diet Recall:  (Typical Day)  Meal Name Time Food 6/1 Food    Breakfast  2-3 cups coffee At least 2 strong cups coffee     Either smoothie with fruit and yogurt (<1/4 c) and hemp protein powder and ground flaxseed and jovani seeds OR Low FODMAP waffle recipe Low FODMAP waffle recipe; Today was [employer] protein bar (5 g fiber, chicory root as ingredient)         Lunch  A lot of pad [place] OR fried rice (tries to have more volume of vegetables than grain) Tries to eat vegetables and protein (tends to be a salad; will sometimes add chicken or lunch meat)      Today sausage, potato, and butternut squash soup AND blue corn tortilla chips AND cashews, almonds, and craisins         Dinner  Stir love Ground beef, chicken, and italian sausage as protein with starch and vegetable         Snacks  Apple cake from AID-IBD website N/A   Beverages   N/A   Alcohol   Red wine well tolerated N/A   *Doesn't think dairy is tolerated. Has tried almond and oatmilk, but they seem to be gas producing.   **Did stop eating pineapple and seems to be associated with headaches    Frequency of eating/taking out meals: 1-2x/wk; chik-ermias-a on gluten free bun OR salmon at [employer] OR stir love at [employer]  [employer]  access/availability: Fine  Food preparation confidence/abilities: Fine    Anthropometrics:   Height: Data Unavailable  Recent Weight (patient reported): 158.8 pounds  BMI: There is no height or weight on file to calculate BMI.    Weight History:  Wt Readings from Last 10 Encounters:   19 83.1 kg (183 lb 3.2 oz)     Usual Weight: 180#, but think that ideal weight is closer to 130-140  Weight change in past 6 months: She believes she may have experienced a small weight loss, otherwise believes weight to have remained stable    Labs: Reviewed  Pertinent Medications/vitamin and mineral supplements:   Current Outpatient Medications   Medication     CHASTE TREE PO     Cholecalciferol (VITAMIN D-3) 125 MCG (5000 UT) TABS     UNABLE TO FIND     UNABLE TO FIND     vitamin (B COMPLEX-C) tablet     No current facility-administered medications for this visit.        Food Allergies: N/A  Food Intolerances: N/A  Physical Activity: N/A  Estimated Nutrition Needs based on most recent body weight of 83.1 k5074-0600 calories (20-25 kcals/kg), 65 g protein (0.8 g/kg), 2000 ml fluids (~1 ml/kcal or total fluids per MD).     MALNUTRITION:  % Weight Loss:  Weight loss does not meet criteria for malnutrition   % Intake:  No decreased intake noted  Subcutaneous Fat Loss:  None observed  Muscle Loss: None observed  Fluid Retention:  None noted    Nutrition Diagnosis:    Food and nutrition related knowledge deficit related to IBD as evidenced by need for diet education.    Nutrition Prescription: Regular    Nutrition Intervention:    Nutrition Education/Counseling:  Discussed diet and symptom history. Discussed continuation and modification of recent diet changes. Discussed certain aspects of the FODMAP diet based on review of diet and symptoms. Discussed the role that fructose and polyols play in increased luminal water, which may be explaining some of her symptoms. Encouraged limiting high fructose and polyol foods specifically at  this time rather than restarting an entire low FODMAP diet. Discussed the prebiotic function that some of the fermentable types of fibers, which are considered high fodmap, serve in the gut.    Educational Materials Provided: No education materials provided at this time  Patient verbalized understanding of education provided. See all recommendations under Goals.    Goals:  1. Continue to primarily focus on grain recommendations in IBD-AID, while reintroducing gluten as comfortable    2. Limit intake of fructose and mannitol by limiting high fodmap fruits/vegetables at this time    3. Reinforced that IBD-AID diet is intended to provide a general illustration of a soft diet and that this is not an exhaustive list      Nutrition Monitoring and Evaluation: Will monitor adherence to nutrition recommendations at future RD visits.     Further Medical Nutrition Therapy: Yes  Next Appointment (if applicable): 6 weeks  Patient was encouraged to call/contact RD with any further questions.

## 2020-07-07 ENCOUNTER — VIRTUAL VISIT (OUTPATIENT)
Dept: GASTROENTEROLOGY | Facility: CLINIC | Age: 40
End: 2020-07-07
Payer: COMMERCIAL

## 2020-07-07 DIAGNOSIS — F43.23 ADJUSTMENT DISORDER WITH MIXED ANXIETY AND DEPRESSED MOOD: Primary | ICD-10-CM

## 2020-07-07 NOTE — PROGRESS NOTES
"The patient has been notified of the following:      \"We have found that certain health care needs can be provided without the need for a face to face visit.  This service lets us provide the care you need with a phone conversation.       I will have full access to your [facility] medical record during this entire phone call.   I will be taking notes for your medical record.      Since this is like an office visit, we will bill your insurance company for this service.       There are potential benefits and risks of telephone visits (e.g. limits to patient confidentiality) that differ from in-person visits.?  Confidentiality still applies for telephone services, and nobody will record the visit.  It is important to be in a quiet, private space that is free of distractions (including cell phone or other devices) during the visit.??      If during the course of the call I believe a telephone visit is not appropriate, you will not be charged for this service\"     Consent has been obtained for this service by care team member: Yes     Attempted [facility] visit, but due to video connection issues, transitioned to phone.    Health Psychology                  Clinic    Department of Medicine  Anne Marie Jimenez, PhD, LP [phone]                          Clinics and Surgery Center  [facility] Loco Hernandez, PhD, LP [phone]                  3rd Floor  [place] Mail Code 741   Gee Flood, PhD, ABPP, LP [phone]     [facility], [address],  Grace Bolaños,  PhD, LP [phone]            [place] [address] Maryann Grady, PhD, LP [phone]    Health Psychology Follow-Up Note    SUBJECTIVE:  Chiquis King was seen for individual psychotherapy. She reported having a rough month with GI symptoms, particularly with gas, pain, and bloating. Has felt breathing was helpful and continues to practice this. Feels as if diet is connected to migraines as " well, which also was present more last month. Doesn't feel as if she has a clear idea of food triggers. Also noted increase in conversations in working out longstanding issues with her  re: parenting and relationship dynamics.  She also reported and emotional impact regarding seeing her daughter at the same age she was when she developed body image issues, and seeing her daughter go through a similar thing.  She reported this is linked to growing up with a mother who had an authoritarian parenting style and did not display warmth openly, thus leaving her feeling as if her worth was conditional.  Discussed link between emotions and physical sensations and longstanding digestive health issues.  Discussed recognition and expression of emotions as an mechanism to help improve physical health.      Discussed the practice of using a self-compassion meditation practice, RAIN, as a to move through challenging feelings when they arise. Provided psychoeducation about the steps of RAIN, including:   -Recognizing (consciously acknowledging, in any given moment, the thoughts, feelings, and behaviors that are affecting us)  -Allowing the experience to be there just as it is (Allowing means letting the thoughts, emotions, feelings, or sensations we have recognized simply be there, without trying to fix or avoid anything)  -Investigate with interest and care (Bring an interested and kind attention to your experience)  -Nurture with self-compassion (offering love, kindness, care to self)    OBJECTIVE:  Appearance/Behavior/Orientation: Alert and oriented to person, place, time, and situation.  Cooperation/Reliability: Patient was open and cooperative throughout the session.    Speech/Language: Speech was clear, coherent, and of normal rate, rhythm and volume.   Thought Form: Overall logical and organized.   Judgment: Intact.    Mood/Affect: Mood anxious; appropriate range of affect.    Insight/Motivation: Fair,  good      ASSESSMENT:  Chiquis King is a 40 year old female with longstanding history of alterations in bowel patterns.  Changes may have originated with pregnancy and postpartum.;  Sleep deprivation and stress of that.  May have also contributed to ongoing nature of these concerns.  Has limited insight into the connection between stress and symptoms; however, she describes herself as a person with higher levels of stress.  Is likely a good candidate for brain gut psychotherapy.    DIAGNOSIS:  Adjustment disorder with mixed anxiety and depressed mood    PLAN:  RTC for continued psychotherapy.     Time in:3:07  Time out:4:04  Extended session due to complexity of case and length of interval.    Grace Bolaños, PhD,   Clinical Health Psychologist    Tx plan completed: 06/19/20  Tx plan due:  06/19/21    *no letterp    This note was completed using Dragon voice recognition software.  Although reviewed after completion, some word and grammatical errors may occur.

## 2020-07-07 NOTE — LETTER
Date:July 23, 2020      Provider requested that no letter be sent. Do not send.       [facility] Health Information

## 2020-07-07 NOTE — LETTER
"    7/7/2020         RE: Chiquis King  [address] [place] [address]        Dear Colleague,    Thank you for referring your patient, Chiquis King, to the [facility] GASTROENTEROLOG[facility] IBD [facility]IN[facility]. Please see a copy of my visit note below.    The patient has been notified of the following:      \"We have found that certain health care needs can be provided without the need for a face to face visit.  This service lets us provide the care you need with a phone conversation.       I will have full access to your [facility] medical record during this entire phone call.   I will be taking notes for your medical record.      Since this is like an office visit, we will bill your insurance company for this service.       There are potential benefits and risks of telephone visits (e.g. limits to patient confidentiality) that differ from in-person visits.?  Confidentiality still applies for telephone services, and nobody will record the visit.  It is important to be in a quiet, private space that is free of distractions (including cell phone or other devices) during the visit.??      If during the course of the call I believe a telephone visit is not appropriate, you will not be charged for this service\"     Consent has been obtained for this service by care team member: Yes     Attempted [facility] visit, but due to video connection issues, transitioned to phone.    Health Psychology                  Clinic    Department of Medicine  Anne Marie Jimenez, PhD, LP [phone]                          Clinics and Surgery Center  [facility] Loco Hernandez, PhD, LP [phone]                  3rd Floor  [place] Mail Code 741   Gee Flood, PhD, ABPP, LP [phone]      [facility], [address],  Grace Bolaños,  PhD, LP [phone]            [place] [address]  [place] [address] Maryann Grady, PhD, LP [phone]    Health Psychology Follow-Up Note    SUBJECTIVE:  Chiquis [place]" Christine was seen for individual psychotherapy. She reported having a rough month with GI symptoms, particularly with gas, pain, and bloating. Has felt breathing was helpful and continues to practice this. Feels as if diet is connected to migraines as well, which also was present more last month. Doesn't feel as if she has a clear idea of food triggers. Also noted increase in conversations in working out longstanding issues with her  re: parenting and relationship dynamics.  She also reported and emotional impact regarding seeing her daughter at the same age she was when she developed body image issues, and seeing her daughter go through a similar thing.  She reported this is linked to growing up with a mother who had an authoritarian parenting style and did not display warmth openly, thus leaving her feeling as if her worth was conditional.  Discussed link between emotions and physical sensations and longstanding digestive health issues.  Discussed recognition and expression of emotions as an mechanism to help improve physical health.      Discussed the practice of using a self-compassion meditation practice, RAIN, as a to move through challenging feelings when they arise. Provided psychoeducation about the steps of RAIN, including:   -Recognizing (consciously acknowledging, in any given moment, the thoughts, feelings, and behaviors that are affecting us)  -Allowing the experience to be there just as it is (Allowing means letting the thoughts, emotions, feelings, or sensations we have recognized simply be there, without trying to fix or avoid anything)  -Investigate with interest and care (Bring an interested and kind attention to your experience)  -Nurture with self-compassion (offering love, kindness, care to self)    OBJECTIVE:  Appearance/Behavior/Orientation: Alert and oriented to person, place, time, and situation.  Cooperation/Reliability: Patient was open and cooperative throughout the session.     Speech/Language: Speech was clear, coherent, and of normal rate, rhythm and volume.   Thought Form: Overall logical and organized.   Judgment: Intact.    Mood/Affect: Mood anxious; appropriate range of affect.    Insight/Motivation: Fair, good      ASSESSMENT:  Chiquis King is a 40 year old female with longstanding history of alterations in bowel patterns.  Changes may have originated with pregnancy and postpartum.;  Sleep deprivation and stress of that.  May have also contributed to ongoing nature of these concerns.  Has limited insight into the connection between stress and symptoms; however, she describes herself as a person with higher levels of stress.  Is likely a good candidate for brain gut psychotherapy.    DIAGNOSIS:  Adjustment disorder with mixed anxiety and depressed mood    PLAN:  RTC for continued psychotherapy.     Time in:3:07  Time out:4:04  Extended session due to complexity of case and length of interval.    Grace Bolaños, PhD,   Clinical Health Psychologist    Tx plan completed: 06/19/20  Tx plan due:  06/19/21    *no letterp    This note was completed using Dragon voice recognition software.  Although reviewed after completion, some word and grammatical errors may occur.          Again, thank you for allowing me to participate in the care of your patient.        Sincerely,        Grace Bolaños, PhD

## 2020-08-30 NOTE — PROGRESS NOTES
"[facility] Outpatient Medical Nutrition Therapy    Chiquis King is a 40 year old female who is being evaluated via a billable video visit.      The patient has been notified of following:     \"This video visit will be conducted via a call between you and your physician/provider. We have found that certain health care needs can be provided without the need for an in-person physical exam.  This service lets us provide the care you need with a video conversation.  If a prescription is necessary we can send it directly to your pharmacy.  If lab work is needed we can place an order for that and you can then stop by our lab to have the test done at a later time.    Video visits are billed at different rates depending on your insurance coverage.  Please reach out to your insurance provider with any questions.    If during the course of the call the physician/provider feels a video visit is not appropriate, you will not be charged for this service.\"    Patient has given verbal consent for Video visit? Yes    How would you like to obtain your AVS? NA    Patient would like the video invitation sent by: Text to cell phone: [phone]    Will anyone else be joining your video visit? No        Video-Visit Details    Type of service:  Video Visit    Video Start Time: 2:23 PM  Video End Time: 3:10 PM    Originating Location (pt. Location): Home     During this Video visit the patient is located in [place], patient verifies this as the location during the entirety of this Video visit.     Distant Location (provider location):  [facility] GASTROENTEROLOGY AND IBD CLINIC     Platform used for Video Visit: Clare Lang, PhD, RD    Additional provider notes:  Referring Physician: Junior  Reason for RD Visit: IBS     Nutrition Plan: Continue soluble fiber rich, soft texture, low fat diet. Modified low FODMAP diet with an continued emphasis on limiting foods high in fructose (paritcularly concentrated sweets)    Recommendations " "for MD/Provider to order: None at this time.    Malnutrition Diagnosis: Patient does not meet the criteria necessary for diagnosing malnutrition    Nutrition Assessment:  Patient is here for follow-up visit with Registered Dietitian (KHARI).  Patient is a 40 year old female who presents for change in bowel pattern. She was seen by Jitendra Pacheco PA-C 5/4/2020 at which time she reported some success with a low FODMAP diet. Altered bowel pattern started back with first pregnancy when placed on bedrest and became constipated. She was also diagnosed with gestational diabetes, which she controlled with diet. Feels like having to work too hard to find foods that work for her. During our last visit 5/18/2020, we implemented a soft texture, low fat diet that emphasizes sources of soluble fiber and she has been working to implement this since that time.     Since the last visit 7/6/2020 she has had almost no high fructose fruits. She has also stayed away from some higher FODMAP vegetables. In general she has had a lot less gas, but continues to struggle with constipation and a feeling of \"heaviness\" in her stomach as well as bloating. She has been experimenting with grains, particularly wheat, and feels this is contributing. In the last 2-3 weeks she has isolated fructose as being particularly hard for her to tolerate. During the visit today we primarily discussed a number of questions that the patient had. Overall plan is to continue same soft texture, low fat, soluble fiber rich diet.    Past Surgical History: None    Symptom Review  Primary symptom of concern at this time is constipation and bloating. Feels that wheat intake has increased recently and that this has contributed to symptoms.    Dietary beliefs and Practices  1.  Did you modify your diet leading up to diagnosis OR Have you modified your diet since diagnosis?  Yes Has trialed gluten free, GAPS, and low FODMAP  8.  Have you received prior advice on diet?  Yes   A. " If so, source? GAPS and gluten free diet from primary care physician  9.  Do you follow a specific diet?  No   A. Which one(s)?  N/A   B. Did/Do you find it beneficial?  N/A    I. If able to articulate, what specifically is the benefit? N/A  10.  Do you take any vitamin, mineral, or herbal supplements? Yes: Chaste tree, fish oil, Mg oxide, vitamin D, align probiotic, vitamin B6  11.  Do you use any calorie/protein supplements?  Yes: Sometimes uses hemp protein powder in waffle recipe      Diet Recall:  (Typical Day)  Meal Name Time Food 6/1 Food    Breakfast  2-3 cups coffee At least 2 strong cups coffee     Either smoothie with fruit and yogurt (<1/4 c) and hemp protein powder and ground flaxseed and jovani seeds OR Low FODMAP waffle recipe Low FODMAP waffle recipe; Today was nature [place] protein bar (5 g fiber, chicory root as ingredient)         Lunch  A lot of pad Latvian OR fried rice (tries to have more volume of vegetables than grain) Tries to eat vegetables and protein (tends to be a salad; will sometimes add chicken or lunch meat)      Today sausage, potato, and butternut squash soup AND blue corn tortilla chips AND cashews, almonds, and craisins         Dinner  Stir love Ground beef, chicken, and italian sausage as protein with starch and vegetable         Snacks  Apple cake from AID-IBD website N/A   Beverages   N/A   Alcohol   Red wine well tolerated N/A   *Doesn't think dairy is tolerated. Has tried almond and oatmilk, but they seem to be gas producing.   **Did stop eating pineapple and seems to be associated with headaches    Frequency of eating/taking out meals: 1-2x/wk; chik-ermias-a on gluten free bun OR salmon at [employer] OR stir love at [employer]  Food access/availability: Fine  Food preparation confidence/abilities: Fine    Anthropometrics:   Height: Data Unavailable  Recent Weight (patient reported): 152 pounds  BMI: There is no height or weight on file to calculate BMI.    Weight History:  Wt  Readings from Last 10 Encounters:   20 83.1 kg (183 lb 3.2 oz)     Usual Weight: 180#, but think that ideal weight is closer to 130-140  Weight change in past 6 months: She believes she may have experienced a small weight loss, otherwise believes weight to have remained stable    Labs: Reviewed  Pertinent Medications/vitamin and mineral supplements:   Current Outpatient Medications   Medication     CHASTE TREE PO     Cholecalciferol (VITAMIN D-3) 125 MCG (5000 UT) TABS     UNABLE TO FIND     UNABLE TO FIND     vitamin (B COMPLEX-C) tablet     No current facility-administered medications for this visit.        Food Allergies: N/A  Food Intolerances: N/A  Physical Activity: N/A  Estimated Nutrition Needs based on recent body weight of 83.1 [id number] calories (20-25 kcals/kg), 65 g protein (0.8 g/kg), 2000 ml fluids (~1 ml/kcal or total fluids per MD).     MALNUTRITION:  % Weight Loss:  Weight loss does not meet criteria for malnutrition   % Intake:  No decreased intake noted  Subcutaneous Fat Loss:  None observed  Muscle Loss: None observed  Fluid Retention:  None noted    Nutrition Diagnosis:    Food and nutrition related knowledge deficit related to IBD as evidenced by need for diet education.    Nutrition Prescription: Regular    Nutrition Intervention:    Nutrition Education/Counseling:  Discussed diet and symptom history. Discussed continuation and modification of recent diet changes. Primarily discussed patient questions. Questions discussed included a discussion about grains, and in particular refined grains, and their capacity to be constipating. We discussed how intake of these foods also displaces potential for intake of fiber-containing foods. Discussed the current understanding of the impact of food additives such as emulsifiers and other prebiotics on gut health and the gut microbiome. Reviewed importance of limiting fat intake to help minimize GI symptoms. Discussed concerns regarding probiotic  timing. Discussed inclusion of a multivitamin    Educational Materials Provided: No education materials provided at this time  Patient verbalized understanding of education provided. See all recommendations under Goals.    Goals:  1. Continue to reintroduce gluten as comfortable, but limit portions, particularly of refined grains. We discussed the potential for these items to displace fiber containing foods that can help with constipation    2. Continue to limit intake of fructose and mannitol. Primarily focus on limited concentrated sweets, but we also discussed continuing to limit high fodmap fruits/vegetables     3. Continue to follow a lower-fat diet. Focus on limiting serving sizes when preparing a higher fat meal    Nutrition Monitoring and Evaluation: Will monitor adherence to nutrition recommendations at future RD visits.     Further Medical Nutrition Therapy: Yes  Next Appointment (if applicable): 3 months  Patient was encouraged to call/contact RD with any further questions.

## 2020-08-31 ENCOUNTER — VIRTUAL VISIT (OUTPATIENT)
Dept: GASTROENTEROLOGY | Facility: CLINIC | Age: 40
End: 2020-08-31
Payer: COMMERCIAL

## 2020-08-31 DIAGNOSIS — R19.4 CHANGE IN BOWEL HABITS: ICD-10-CM

## 2020-08-31 DIAGNOSIS — K58.9 IBS (IRRITABLE BOWEL SYNDROME): Primary | ICD-10-CM

## 2020-08-31 DIAGNOSIS — Z71.3 NUTRITIONAL COUNSELING: ICD-10-CM

## 2020-08-31 NOTE — PATIENT INSTRUCTIONS
Braden Edmondson,    It was great seeing you again today. Below is a summary of what we discussed:    1. Continue to reintroduce gluten as comfortable, but limit portions, particularly of refined grains. We discussed the potential for these items to displace fiber containing foods that can help with constipation    2. Continue to limit intake of fructose and mannitol. Primarily focus on limited concentrated sweets, but we also discussed continuing to limit high fodmap fruits/vegetables     3. Continue to follow a lower-fat diet. Focus on limiting serving sizes when preparing a higher fat meal    Best regards,  Adrian Lang, PhD, RD

## 2020-08-31 NOTE — LETTER
"    8/31/2020         RE: Chiquis King  [address] [place] [address]        Dear Colleague,    Thank you for referring your patient, Chiquis King, to the [facility] GASTROENTER[facility] IBD [facility]INIC. Please see a copy of my visit note below.    [facility] Outpatient Medical Nutrition Therapy    Chiquis King is a 40 year old female who is being evaluated via a billable video visit.      The patient has been notified of following:     \"This video visit will be conducted via a call between you and your physician/provider. We have found that certain health care needs can be provided without the need for an in-person physical exam.  This service lets us provide the care you need with a video conversation.  If a prescription is necessary we can send it directly to your pharmacy.  If lab work is needed we can place an order for that and you can then stop by our lab to have the test done at a later time.    Video visits are billed at different rates depending on your insurance coverage.  Please reach out to your insurance provider with any questions.    If during the course of the call the physician/provider feels a video visit is not appropriate, you will not be charged for this service.\"    Patient has given verbal consent for Video visit? Yes    How would you like to obtain your AVS? NA    Patient would like the video invitation sent by: Text to cell phone: [phone]    Will anyone else be joining your video visit? No        Video-Visit Details    Type of service:  Video Visit    Video Start Time: 2:23 PM  Video End Time: 3:10 PM    Originating Location (pt. Location): Home     During this Video visit the patient is located in [place], patient verifies this as the location during the entirety of this Video visit.     Distant Location (provider location):  [facility]     Platform used for Video Visit: Clare Lang, PhD, RD    Additional provider notes:  Referring " "Physician: Junior  Reason for RD Visit: IBS     Nutrition Plan: Continue soluble fiber rich, soft texture, low fat diet. Modified low FODMAP diet with an continued emphasis on limiting foods high in fructose (paritcularly concentrated sweets)    Recommendations for MD/Provider to order: None at this time.    Malnutrition Diagnosis: Patient does not meet the criteria necessary for diagnosing malnutrition    Nutrition Assessment:  Patient is here for follow-up visit with Registered Dietitian (RD).  Patient is a 40 year old female who presents for change in bowel pattern. She was seen by Jitendra Pacheco PA-C 5/4/2020 at which time she reported some success with a low FODMAP diet. Altered bowel pattern started back with first pregnancy when placed on bedrest and became constipated. She was also diagnosed with gestational diabetes, which she controlled with diet. Feels like having to work too hard to find foods that work for her. During our last visit 5/18/2020, we implemented a soft texture, low fat diet that emphasizes sources of soluble fiber and she has been working to implement this since that time.     Since the last visit 7/6/2020 she has had almost no high fructose fruits. She has also stayed away from some higher FODMAP vegetables. In general she has had a lot less gas, but continues to struggle with constipation and a feeling of \"heaviness\" in her stomach as well as bloating. She has been experimenting with grains, particularly wheat, and feels this is contributing. In the last 2-3 weeks she has isolated fructose as being particularly hard for her to tolerate. During the visit today we primarily discussed a number of questions that the patient had. Overall plan is to continue same soft texture, low fat, soluble fiber rich diet.    Past Surgical History: None    Symptom Review  Primary symptom of concern at this time is constipation and bloating. Feels that wheat intake has increased recently and that this has " contributed to symptoms.    Dietary beliefs and Practices  1.  Did you modify your diet leading up to diagnosis OR Have you modified your diet since diagnosis?  Yes Has trialed gluten free, GAPS, and low FODMAP  8.  Have you received prior advice on diet?  Yes   A. If so, source? GAPS and gluten free diet from primary care physician  9.  Do you follow a specific diet?  No   A. Which one(s)?  N/A   B. Did/Do you find it beneficial?  N/A    I. If able to articulate, what specifically is the benefit? N/A  10.  Do you take any vitamin, mineral, or herbal supplements? Yes: Chaste tree, fish oil, Mg oxide, vitamin D, align probiotic, vitamin B6  11.  Do you use any calorie/protein supplements?  Yes: Sometimes uses hemp protein powder in waffle recipe      Diet Recall:  (Typical Day)  Meal Name Time Food 6/1 Food    Breakfast  2-3 cups coffee At least 2 strong cups coffee     Either smoothie with fruit and yogurt (<1/4 c) and hemp protein powder and ground flaxseed and jovani seeds OR Low FODMAP waffle recipe Low FODMAP waffle recipe; Today was nature valley protein bar (5 g fiber, chicory root as ingredient)         Lunch  A lot of pad catalina OR fried rice (tries to have more volume of vegetables than grain) Tries to eat vegetables and protein (tends to be a salad; will sometimes add chicken or lunch meat)      Today sausage, potato, and butternut squash soup AND blue corn tortilla chips AND cashews, almonds, and craisins         Dinner  Stir love Ground beef, chicken, and italian sausage as protein with starch and vegetable         Snacks  Apple cake from AID-IBD website N/A   Beverages   N/A   Alcohol   Red wine well tolerated N/A   *Doesn't think dairy is tolerated. Has tried almond and oatmilk, but they seem to be gas producing.   **Did stop eating pineapple and seems to be associated with headaches    Frequency of eating/taking out meals: 1-2x/wk; chik-ermias-a on gluten free bun OR salmon at [employer]es OR stir love at [employer]  Food access/availability: Fine  Food preparation confidence/abilities: Fine    Anthropometrics:   Height: Data Unavailable  Recent Weight (patient reported): 152 pounds  BMI: There is no height or weight on file to calculate BMI.    Weight History:  Wt Readings from Last 10 Encounters:   19 83.1 kg (183 lb 3.2 oz)     Usual Weight: 180#, but think that ideal weight is closer to 130-140  Weight change in past 6 months: She believes she may have experienced a small weight loss, otherwise believes weight to have remained stable    Labs: Reviewed  Pertinent Medications/vitamin and mineral supplements:   Current Outpatient Medications   Medication     CHASTE TREE PO     Cholecalciferol (VITAMIN D-3) 125 MCG (5000 UT) TABS     UNABLE TO FIND     UNABLE TO FIND     vitamin (B COMPLEX-C) tablet     No current facility-administered medications for this visit.        Food Allergies: N/A  Food Intolerances: N/A  Physical Activity: N/A  Estimated Nutrition Needs based on recent body weight of 83.1 k2736-4181 calories (20-25 kcals/kg), 65 g protein (0.8 g/kg), 2000 ml fluids (~1 ml/kcal or total fluids per MD).     MALNUTRITION:  % Weight Loss:  Weight loss does not meet criteria for malnutrition   % Intake:  No decreased intake noted  Subcutaneous Fat Loss:  None observed  Muscle Loss: None observed  Fluid Retention:  None noted    Nutrition Diagnosis:    Food and nutrition related knowledge deficit related to IBD as evidenced by need for diet education.    Nutrition Prescription: Regular    Nutrition Intervention:    Nutrition Education/Counseling:  Discussed diet and symptom history. Discussed continuation and modification of recent diet changes. Primarily discussed patient questions. Questions discussed included a discussion about grains, and in particular refined grains, and their capacity to be constipating. We discussed how intake of these foods also displaces potential for intake of fiber-containing  foods. Discussed the current understanding of the impact of food additives such as emulsifiers and other prebiotics on gut health and the gut microbiome. Reviewed importance of limiting fat intake to help minimize GI symptoms. Discussed concerns regarding probiotic timing. Discussed inclusion of a multivitamin    Educational Materials Provided: No education materials provided at this time  Patient verbalized understanding of education provided. See all recommendations under Goals.    Goals:  1. Continue to reintroduce gluten as comfortable, but limit portions, particularly of refined grains. We discussed the potential for these items to displace fiber containing foods that can help with constipation    2. Continue to limit intake of fructose and mannitol. Primarily focus on limited concentrated sweets, but we also discussed continuing to limit high fodmap fruits/vegetables     3. Continue to follow a lower-fat diet. Focus on limiting serving sizes when preparing a higher fat meal    Nutrition Monitoring and Evaluation: Will monitor adherence to nutrition recommendations at future RD visits.     Further Medical Nutrition Therapy: Yes  Next Appointment (if applicable): 3 months  Patient was encouraged to call/contact RD with any further questions.                 Again, thank you for allowing me to participate in the care of your patient.        Sincerely,        Adrian Lang RD

## 2020-08-31 NOTE — LETTER
"8/31/2020       RE: Chiquis King  [address]  [address] [place] [address]     Dear Colleague,    Thank you for referring your patient, Chiquis King, to the [facility] GASTROENTEROLOGY AND IBD CLINIC at [facility]. Please see a copy of my visit note below.    [facility] Outpatient Medical Nutrition Therapy      Additional provider notes:  Referring Physician: Junior  Reason for RD Visit: IBS     Nutrition Plan: Continue soluble fiber rich, soft texture, low fat diet. Modified low FODMAP diet with an continued emphasis on limiting foods high in fructose (paritcularly concentrated sweets)    Recommendations for MD/Provider to order: None at this time.    Malnutrition Diagnosis: Patient does not meet the criteria necessary for diagnosing malnutrition    Nutrition Assessment:  Patient is here for follow-up visit with Registered Dietitian (KHARI).  Patient is a 40 year old female who presents for change in bowel pattern. She was seen by Jitendra Pacheco PA-C 5/4/2020 at which time she reported some success with a low FODMAP diet. Altered bowel pattern started back with first pregnancy when placed on bedrest and became constipated. She was also diagnosed with gestational diabetes, which she controlled with diet. Feels like having to work too hard to find foods that work for her. During our last visit 5/18/2020, we implemented a soft texture, low fat diet that emphasizes sources of soluble fiber and she has been working to implement this since that time.     Since the last visit 7/6/2020 she has had almost no high fructose fruits. She has also stayed away from some higher FODMAP vegetables. In general she has had a lot less gas, but continues to struggle with constipation and a feeling of \"heaviness\" in her stomach as well as bloating. She has been experimenting with grains, particularly wheat, and feels this is contributing. In the last 2-3 weeks she has isolated fructose as being particularly " hard for her to tolerate. During the visit today we primarily discussed a number of questions that the patient had. Overall plan is to continue same soft texture, low fat, soluble fiber rich diet.    Past Surgical History: None    Symptom Review  Primary symptom of concern at this time is constipation and bloating. Feels that wheat intake has increased recently and that this has contributed to symptoms.    Dietary beliefs and Practices  1.  Did you modify your diet leading up to diagnosis OR Have you modified your diet since diagnosis?  Yes Has trialed gluten free, GAPS, and low FODMAP  8.  Have you received prior advice on diet?  Yes   A. If so, source? GAPS and gluten free diet from primary care physician  9.  Do you follow a specific diet?  No   A. Which one(s)?  N/A   B. Did/Do you find it beneficial?  N/A    I. If able to articulate, what specifically is the benefit? N/A  10.  Do you take any vitamin, mineral, or herbal supplements? Yes: Chaste tree, fish oil, Mg oxide, vitamin D, align probiotic, vitamin B6  11.  Do you use any calorie/protein supplements?  Yes: Sometimes uses hemp protein powder in waffle recipe    Diet Recall:  (Typical Day)  Meal Name Time Food 6/1 Food    Breakfast  2-3 cups coffee At least 2 strong cups coffee     Either smoothie with fruit and yogurt (<1/4 c) and hemp protein powder and ground flaxseed and jovani seeds OR Low FODMAP waffle recipe Low FODMAP waffle recipe; Today was nature valley protein bar (5 g fiber, chicory root as ingredient)         Lunch  A lot of pad catalina OR fried rice (tries to have more volume of vegetables than grain) Tries to eat vegetables and protein (tends to be a salad; will sometimes add chicken or lunch meat)      Today sausage, potato, and butternut squash soup AND blue corn tortilla chips AND cashews, almonds, and craisins         Dinner  Stir love Ground beef, chicken, and italian sausage as protein with starch and vegetable         Snacks  Apple cake  from AID-IBD website N/A   Beverages   N/A   Alcohol   Red wine well tolerated N/A   *Doesn't think dairy is tolerated. Has tried almond and oatmilk, but they seem to be gas producing.   **Did stop eating pineapple and seems to be associated with headaches    Frequency of eating/taking out meals: 1-2x/wk; chik-ermias-a on gluten free bun OR salmon at [employer] OR stir love at [employer]  Food access/availability: Fine  Food preparation confidence/abilities: Fine    Anthropometrics:   Height: Data Unavailable  Recent Weight (patient reported): 152 pounds  BMI: There is no height or weight on file to calculate BMI.    Weight History:  Wt Readings from Last 10 Encounters:   19 83.1 kg (183 lb 3.2 oz)     Usual Weight: 180#, but think that ideal weight is closer to 130-140  Weight change in past 6 months: She believes she may have experienced a small weight loss, otherwise believes weight to have remained stable    Labs: Reviewed  Pertinent Medications/vitamin and mineral supplements:   Current Outpatient Medications   Medication     CHASTE TREE PO     Cholecalciferol (VITAMIN D-3) 125 MCG (5000 UT) TABS     UNABLE TO FIND     UNABLE TO FIND     vitamin (B COMPLEX-C) tablet     No current facility-administered medications for this visit.        Food Allergies: N/A  Food Intolerances: N/A  Physical Activity: N/A  Estimated Nutrition Needs based on recent body weight of 83.1 k8291-6590 calories (20-25 kcals/kg), 65 g protein (0.8 g/kg), 2000 ml fluids (~1 ml/kcal or total fluids per MD).     MALNUTRITION:  % Weight Loss:  Weight loss does not meet criteria for malnutrition   % Intake:  No decreased intake noted  Subcutaneous Fat Loss:  None observed  Muscle Loss: None observed  Fluid Retention:  None noted    Nutrition Diagnosis:    Food and nutrition related knowledge deficit related to IBD as evidenced by need for diet education.    Nutrition Prescription: Regular    Nutrition Intervention:    Nutrition  Education/Counseling:  Discussed diet and symptom history. Discussed continuation and modification of recent diet changes. Primarily discussed patient questions. Questions discussed included a discussion about grains, and in particular refined grains, and their capacity to be constipating. We discussed how intake of these foods also displaces potential for intake of fiber-containing foods. Discussed the current understanding of the impact of food additives such as emulsifiers and other prebiotics on gut health and the gut microbiome. Reviewed importance of limiting fat intake to help minimize GI symptoms. Discussed concerns regarding probiotic timing. Discussed inclusion of a multivitamin    Educational Materials Provided: No education materials provided at this time  Patient verbalized understanding of education provided. See all recommendations under Goals.    Goals:  1. Continue to reintroduce gluten as comfortable, but limit portions, particularly of refined grains. We discussed the potential for these items to displace fiber containing foods that can help with constipation    2. Continue to limit intake of fructose and mannitol. Primarily focus on limited concentrated sweets, but we also discussed continuing to limit high fodmap fruits/vegetables     3. Continue to follow a lower-fat diet. Focus on limiting serving sizes when preparing a higher fat meal    Nutrition Monitoring and Evaluation: Will monitor adherence to nutrition recommendations at future RD visits.     Further Medical Nutrition Therapy: Yes  Next Appointment (if applicable): 3 months  Patient was encouraged to call/contact RD with any further questions.      Again, thank you for allowing me to participate in the care of your patient.  Sincerely,    Adrian Lang RD

## 2020-09-01 ENCOUNTER — VIRTUAL VISIT (OUTPATIENT)
Dept: GASTROENTEROLOGY | Facility: CLINIC | Age: 40
End: 2020-09-01
Payer: COMMERCIAL

## 2020-09-01 DIAGNOSIS — F43.23 ADJUSTMENT DISORDER WITH MIXED ANXIETY AND DEPRESSED MOOD: Primary | ICD-10-CM

## 2020-09-01 NOTE — LETTER
Date:September 4, 2020      Provider requested that no letter be sent. Do not send.       [facility] Health Information
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
## 2021-01-03 ENCOUNTER — HEALTH MAINTENANCE LETTER (OUTPATIENT)
Age: 41
End: 2021-01-03

## 2021-01-14 ENCOUNTER — TELEPHONE (OUTPATIENT)
Dept: GASTROENTEROLOGY | Facility: CLINIC | Age: 41
End: 2021-01-14

## 2021-01-14 ENCOUNTER — TELEPHONE (OUTPATIENT)
Dept: PSYCHOLOGY | Facility: CLINIC | Age: 41
End: 2021-01-14

## 2021-01-14 NOTE — TELEPHONE ENCOUNTER
Called patient to schedule Video visit follow up with Grace Bolaños at next avail. Left CC number and sent MyC.

## 2021-04-25 ENCOUNTER — HEALTH MAINTENANCE LETTER (OUTPATIENT)
Age: 41
End: 2021-04-25

## 2021-05-04 ENCOUNTER — VIRTUAL VISIT (OUTPATIENT)
Dept: GASTROENTEROLOGY | Facility: CLINIC | Age: 41
End: 2021-05-04
Payer: COMMERCIAL

## 2021-05-04 DIAGNOSIS — F43.23 ADJUSTMENT DISORDER WITH MIXED ANXIETY AND DEPRESSED MOOD: Primary | ICD-10-CM

## 2021-05-04 PROCEDURE — 90837 PSYTX W PT 60 MINUTES: CPT | Mod: GT | Performed by: PSYCHOLOGIST

## 2021-05-04 NOTE — PROGRESS NOTES
"This telehealth service is appropriate and effective for delivering services in light of the necessity for social distancing to mitigate the COVID-19 epidemic and for conservation of PPE.     Patient has agreed to receiving telehealth services after being informed about it: Yes    Patient prefers video invitation/information to be sent by:   email    Mode of transmission: Telephone    Location of originating:  Home of the patient    Distance site:  Home office of provider for [facility]    The patient has been notified that:  Video visits will be conducted via a call with their psychologist to provide the care they need with a video conversation. Video visits may be billed at different rates depending on insurance coverage.  Patients are advised to please contact their insurance provider with any questions about their health insurance coverage. If during the course of a call the psychologist feels a video visit is not appropriate, patients will not be charged for this service.    Health Psychology                  Clinic    Department of Medicine  Anne Marie Jimenez, PhD, LP [phone]                          Clinics and Surgery Center  [facility] Mail Code 741   Gee Flood, PhD, ABPP, LP [phone]     [address] [place] [address], [address],  Grace Bolaños,  PhD, LP [phone]            [place] [address] Maryann Grady, PhD, LP [phone]    Health Psychology Follow-Up Note    SUBJECTIVE:  Chiquis King was seen for individual psychotherapy.  Last seen in September 2020. Describes herself as feeling overwhelmed with too much to do, and has felt this way for the last year. Notes on some weekends, she feels \"run down\" and needs her  to help more with childcare.  [place] focused on improving ability to cope with emotions, particularly focused on heightened feelings related to stress.  She at this time feels like her GI " symptoms are well managed and she has benefited from approach as we have discussed previously such as trying to take a neutral, nonjudgmental perspective to them.  Discussed how she typically responds to stress which is to suppress her emotional reaction and push through, solving the problem, which ultimately heightens her stress response.  She reflected that this reminds her of her upbringing in which her parents were not there for her in terms of recognizing or help her meet her emotional needs.  Discussed how this early history has likely taught her certain strategies, which are now feeling unhelpful.  Discussed importance of emotion focused coping approaches, particularly focused on use of self compassion to settle moments of overwhelming stress.      OBJECTIVE:  Appearance/Behavior/Orientation: Alert and oriented to person, place, time, and situation.  Cooperation/Reliability: Patient was open and cooperative throughout the session.    Speech/Language: Speech was clear, coherent, and of normal rate, rhythm and volume.   Thought Form: Overall logical and organized.   Judgment: Intact.    Mood/Affect: Mood overwhelmed; appropriate range of affect.    Insight/Motivation: Fair, good    ASSESSMENT:  Stress negatively impacts her quality of life and health.     DIAGNOSIS:  Adjustment disorder with mixed anxiety and depressed mood    PLAN:  RTC for continued psychotherapy.     Time in: 3:06p   Time out:  3:59p  Extended session due to complexity of case and length of interval.    Grace Bolaños, PhD,   Clinical Health Psychologist    Tx plan completed: 06/19/20  Tx plan due:  06/19/21    *no letterp    This note was completed using Dragon voice recognition software.  Although reviewed after completion, some word and grammatical errors may occur.

## 2021-05-04 NOTE — LETTER
5/4/2021         RE: Chiquis King  [address]  [address] [place] [address]        Dear Colleague,    Thank you for referring your patient, Chiquis King, to the [facility] [place]. Please see a copy of my visit note below.    This telehealth service is appropriate and effective for delivering services in light of the necessity for social distancing to mitigate the COVID-19 epidemic and for conservation of PPE.     Patient has agreed to receiving telehealth services after being informed about it: Yes    Patient prefers video invitation/information to be sent by:   email    Mode of transmission: Telephone    Location of originating:  Home of the patient    Distance site:  Home office of provider for [facility]    The patient has been notified that:  Video visits will be conducted via a call with their psychologist to provide the care they need with a video conversation. Video visits may be billed at different rates depending on insurance coverage.  Patients are advised to please contact their insurance provider with any questions about their health insurance coverage. If during the course of a call the psychologist feels a video visit is not appropriate, patients will not be charged for this service.    Health Psychology                  Clinic    Department of Medicine  Anne Marie Jimenez, PhD, LP [phone]                          Clinics and Surgery Center  [facility] Mail Code 741   Gee Flood, PhD, ABPP, LP [phone]     [address] [place] [address], [address],  Grace Bolaños,  PhD, LP [phone]            [place] [address] Maryann Grady, PhD, LP [phone]    Health Psychology Follow-Up Note    SUBJECTIVE:  Chiquis King was seen for individual psychotherapy.  Last seen in September 2020. Describes herself as feeling overwhelmed with too much to do, and has felt this way for the  "last year. Notes on some weekends, she feels \"run down\" and needs her  to help more with childcare.  [place] focused on improving ability to cope with emotions, particularly focused on heightened feelings related to stress.  She at this time feels like her GI symptoms are well managed and she has benefited from approach as we have discussed previously such as trying to take a neutral, nonjudgmental perspective to them.  Discussed how she typically responds to stress which is to suppress her emotional reaction and push through, solving the problem, which ultimately heightens her stress response.  She reflected that this reminds her of her upbringing in which her parents were not there for her in terms of recognizing or help her meet her emotional needs.  Discussed how this early history has likely taught her certain strategies, which are now feeling unhelpful.  Discussed importance of emotion focused coping approaches, particularly focused on use of self compassion to settle moments of overwhelming stress.      OBJECTIVE:  Appearance/Behavior/Orientation: Alert and oriented to person, place, time, and situation.  Cooperation/Reliability: Patient was open and cooperative throughout the session.    Speech/Language: Speech was clear, coherent, and of normal rate, rhythm and volume.   Thought Form: Overall logical and organized.   Judgment: Intact.    Mood/Affect: Mood overwhelmed; appropriate range of affect.    Insight/Motivation: Fair, good    ASSESSMENT:  Stress negatively impacts her quality of life and health.     DIAGNOSIS:  Adjustment disorder with mixed anxiety and depressed mood    PLAN:  RTC for continued psychotherapy.     Time in: 3:06p   Time out:  3:59p  Extended session due to complexity of case and length of interval.    Grace Bolaños, PhD,   Clinical Health Psychologist    Tx plan completed: 06/19/20  Tx plan due:  06/19/21    *no letterp    This note was completed using Dragon voice recognition " software.  Although reviewed after completion, some word and grammatical errors may occur.            Again, thank you for allowing me to participate in the care of your patient.        Sincerely,        Grace Bolaños, PhD

## 2021-05-04 NOTE — LETTER
Date:July 13, 2021      Provider requested that no letter be sent. Do not send.       [facility]

## 2021-06-02 VITALS — BODY MASS INDEX: 35.51 KG/M2 | WEIGHT: 208 LBS | HEIGHT: 64 IN

## 2021-06-16 PROBLEM — G56.03 BILATERAL CARPAL TUNNEL SYNDROME: Chronic | Status: ACTIVE | Noted: 2018-12-08

## 2021-06-21 NOTE — PROGRESS NOTES
Paper prescription for clindamycin given to patient. Discussed discharge orders. Discharge to home with instructions to follow-up in clinic with Dr. Flores.    Marcelino Hancock RN

## 2021-06-21 NOTE — PROGRESS NOTES
Chiquis Zarate  38w0d arrives with:    Ob complaints: suspected rupture of membranes: Date/time: 18 at 9am suspected, Amount: trickle and Color: clear      Fetal movement: normal  Contractions: every 8-9 minutes  Vaginal discharge/bleeding/LOF: leaking  Urinary frequency/urgency/burning: none  Headache: none  Visual changes: none  Nausea/vomiting: none      Chiquis Edmondson reports increased wetness while she was bending over this morning. She is GBS positive.    OB History      Para Term  AB Living    3 2 2 0 0 2    SAB TAB Ectopic Multiple Live Births    0 0 0 0 2           Complications this pregnancy:  Patient reports polyhydramnios    OB Arrival Protocol entered. UA and [place] applied for NST.  Labs collected: RomPLUS (Negative), UA/UC, Wet Prep    Updated Dr. Flores of patients arrival, fetal strip, and contractions. Orders to collect wet prep, UA and perform cervical exam. Watch for 1-2 hours to see if contractions increase.

## 2021-06-21 NOTE — PROGRESS NOTES
[place] was [place] [facility] [place]  [place]ractions. EFM applied, fetus very active. Cervix checked after contraction and FHT pattern observed for 20 minutes; there were no cervical changes from what patient reported. She was seen in the clinic earlier today. Dr Panda Flores notified of contraction and FHT pattern, cervical exam as well as patient's comfort. Pt to be observed for 2 hours and rechecked.   After the prescribed time, patient was asleep and woken up. Declined cervical exam. Dr Flores updated. Ordered discharge home with advice for patient to take Vistaril that she has at home, and to call with any concerns or signs of labor.

## 2021-06-21 NOTE — PROGRESS NOTES
11/24/18 1159   Provider Notification   Provider Name/Title Mark   Method of Notification Phone   Request Evaluate - remote   Notification Reason Status update;Lab/diagnostic study     Dr. Flores updated on cervical exam, negative UA, wet prep positive for clue cells. Dr. Flores will come to evaluate in 60-90 minutes. Patient informed of results and will continue to walk over the next hour or so.

## 2021-06-23 NOTE — TELEPHONE ENCOUNTER
Called Chiquis Debo after message left on Lactation Line--she reports that baby will be 5 weeks old tomorrow, and had tongue tie clipped at hospital, but she is still having nipple pain.  Nipple is somewhat pointed after release and having some whiteness of nipple as well.  Thinks she is likely available to come for visit today if she can get --scheduled for 1 pm visit at [facility], with option of changing to 3 pm if needed.

## 2021-10-10 ENCOUNTER — HEALTH MAINTENANCE LETTER (OUTPATIENT)
Age: 41
End: 2021-10-10

## 2022-05-21 ENCOUNTER — HEALTH MAINTENANCE LETTER (OUTPATIENT)
Age: 42
End: 2022-05-21

## 2022-09-18 ENCOUNTER — HEALTH MAINTENANCE LETTER (OUTPATIENT)
Age: 42
End: 2022-09-18

## 2023-06-04 ENCOUNTER — HEALTH MAINTENANCE LETTER (OUTPATIENT)
Age: 43
End: 2023-06-04

## 2024-02-25 ENCOUNTER — HEALTH MAINTENANCE LETTER (OUTPATIENT)
Age: 44
End: 2024-02-25

## 2025-04-26 NOTE — PATIENT INSTRUCTIONS
Braden Edmondson,    It was great meeting with you again today. Below is a summary of what we discussed:    1. Continue to primarily focus on grain recommendations in IBD-AID, while reintroducing gluten as comfortable    2. Limit intake of fructose and mannitol by limiting high fodmap fruits/vegetables at this time    3. Reinforced that IBD-AID diet is intended to provide a general illustration of a soft diet and that this is not an exhaustive list    Best regards,  Adrian Lang, PhD, RD     VAP protocol performed    EVAC tube in place? no EVAC tube flushed? N/a    Ventilator arm in use with circuit? yes